# Patient Record
Sex: MALE | Race: OTHER | Employment: FULL TIME | URBAN - METROPOLITAN AREA
[De-identification: names, ages, dates, MRNs, and addresses within clinical notes are randomized per-mention and may not be internally consistent; named-entity substitution may affect disease eponyms.]

---

## 2017-02-10 ENCOUNTER — LAB ENCOUNTER (OUTPATIENT)
Dept: LAB | Age: 47
End: 2017-02-10
Attending: INTERNAL MEDICINE
Payer: COMMERCIAL

## 2017-02-10 ENCOUNTER — OFFICE VISIT (OUTPATIENT)
Dept: FAMILY MEDICINE CLINIC | Facility: CLINIC | Age: 47
End: 2017-02-10

## 2017-02-10 VITALS
BODY MASS INDEX: 32.34 KG/M2 | WEIGHT: 231 LBS | DIASTOLIC BLOOD PRESSURE: 80 MMHG | RESPIRATION RATE: 16 BRPM | HEART RATE: 70 BPM | TEMPERATURE: 98 F | SYSTOLIC BLOOD PRESSURE: 120 MMHG | HEIGHT: 71 IN

## 2017-02-10 DIAGNOSIS — Z00.00 ROUTINE GENERAL MEDICAL EXAMINATION AT A HEALTH CARE FACILITY: ICD-10-CM

## 2017-02-10 DIAGNOSIS — M54.5 LOW BACK PAIN, UNSPECIFIED BACK PAIN LATERALITY, UNSPECIFIED CHRONICITY, WITH SCIATICA PRESENCE UNSPECIFIED: ICD-10-CM

## 2017-02-10 DIAGNOSIS — R10.9 RIGHT FLANK PAIN: ICD-10-CM

## 2017-02-10 DIAGNOSIS — Z00.00 ROUTINE GENERAL MEDICAL EXAMINATION AT A HEALTH CARE FACILITY: Primary | ICD-10-CM

## 2017-02-10 PROBLEM — H91.90 HARD OF HEARING: Status: ACTIVE | Noted: 2017-02-10

## 2017-02-10 LAB
ALBUMIN SERPL-MCNC: 4 G/DL (ref 3.5–4.8)
ALP LIVER SERPL-CCNC: 66 U/L (ref 45–117)
ALT SERPL-CCNC: 36 U/L (ref 17–63)
AST SERPL-CCNC: 30 U/L (ref 15–41)
BASOPHILS # BLD AUTO: 0.06 X10(3) UL (ref 0–0.1)
BASOPHILS NFR BLD AUTO: 0.9 %
BILIRUB SERPL-MCNC: 0.6 MG/DL (ref 0.1–2)
BILIRUB UR QL STRIP.AUTO: NEGATIVE
BUN BLD-MCNC: 13 MG/DL (ref 8–20)
CALCIUM BLD-MCNC: 8.5 MG/DL (ref 8.3–10.3)
CHLORIDE: 105 MMOL/L (ref 101–111)
CHOLEST SMN-MCNC: 153 MG/DL (ref ?–200)
CLARITY UR REFRACT.AUTO: CLEAR
CO2: 28 MMOL/L (ref 22–32)
COLOR UR AUTO: YELLOW
CREAT BLD-MCNC: 1.1 MG/DL (ref 0.7–1.3)
EOSINOPHIL # BLD AUTO: 0.09 X10(3) UL (ref 0–0.3)
EOSINOPHIL NFR BLD AUTO: 1.3 %
ERYTHROCYTE [DISTWIDTH] IN BLOOD BY AUTOMATED COUNT: 13.2 % (ref 11.5–16)
EST. AVERAGE GLUCOSE BLD GHB EST-MCNC: 88 MG/DL (ref 68–126)
GLUCOSE BLD-MCNC: 89 MG/DL (ref 70–99)
GLUCOSE UR STRIP.AUTO-MCNC: NEGATIVE MG/DL
HBA1C MFR BLD HPLC: 4.7 % (ref ?–5.7)
HCT VFR BLD AUTO: 50.6 % (ref 37–53)
HDLC SERPL-MCNC: 45 MG/DL (ref 45–?)
HDLC SERPL: 3.4 {RATIO} (ref ?–4.97)
HGB BLD-MCNC: 17.7 G/DL (ref 13–17)
IMMATURE GRANULOCYTE COUNT: 0.03 X10(3) UL (ref 0–1)
IMMATURE GRANULOCYTE RATIO %: 0.4 %
KETONES UR STRIP.AUTO-MCNC: NEGATIVE MG/DL
LDLC SERPL CALC-MCNC: 81 MG/DL (ref ?–130)
LEUKOCYTE ESTERASE UR QL STRIP.AUTO: NEGATIVE
LYMPHOCYTES # BLD AUTO: 2.03 X10(3) UL (ref 0.9–4)
LYMPHOCYTES NFR BLD AUTO: 30 %
M PROTEIN MFR SERPL ELPH: 7.6 G/DL (ref 6.1–8.3)
MCH RBC QN AUTO: 31.1 PG (ref 27–33.2)
MCHC RBC AUTO-ENTMCNC: 35 G/DL (ref 31–37)
MCV RBC AUTO: 88.9 FL (ref 80–99)
MONOCYTES # BLD AUTO: 0.52 X10(3) UL (ref 0.1–0.6)
MONOCYTES NFR BLD AUTO: 7.7 %
NEUTROPHIL ABS PRELIM: 4.04 X10 (3) UL (ref 1.3–6.7)
NEUTROPHILS # BLD AUTO: 4.04 X10(3) UL (ref 1.3–6.7)
NEUTROPHILS NFR BLD AUTO: 59.7 %
NITRITE UR QL STRIP.AUTO: NEGATIVE
NONHDLC SERPL-MCNC: 108 MG/DL (ref ?–130)
PH UR STRIP.AUTO: 6 [PH] (ref 4.5–8)
PLATELET # BLD AUTO: 137 10(3)UL (ref 150–450)
POTASSIUM SERPL-SCNC: 3.8 MMOL/L (ref 3.6–5.1)
PROT UR STRIP.AUTO-MCNC: NEGATIVE MG/DL
RBC # BLD AUTO: 5.69 X10(6)UL (ref 4.3–5.7)
RBC UR QL AUTO: NEGATIVE
RED CELL DISTRIBUTION WIDTH-SD: 42.5 FL (ref 35.1–46.3)
SODIUM SERPL-SCNC: 139 MMOL/L (ref 136–144)
SP GR UR STRIP.AUTO: 1.02 (ref 1–1.03)
TRIGLYCERIDES: 136 MG/DL (ref ?–150)
UROBILINOGEN UR STRIP.AUTO-MCNC: <2 MG/DL
VLDL: 27 MG/DL (ref 5–40)
WBC # BLD AUTO: 6.8 X10(3) UL (ref 4–13)

## 2017-02-10 PROCEDURE — 80053 COMPREHEN METABOLIC PANEL: CPT

## 2017-02-10 PROCEDURE — 81003 URINALYSIS AUTO W/O SCOPE: CPT

## 2017-02-10 PROCEDURE — 80061 LIPID PANEL: CPT

## 2017-02-10 PROCEDURE — 99396 PREV VISIT EST AGE 40-64: CPT | Performed by: INTERNAL MEDICINE

## 2017-02-10 PROCEDURE — 85025 COMPLETE CBC W/AUTO DIFF WBC: CPT

## 2017-02-10 PROCEDURE — 83036 HEMOGLOBIN GLYCOSYLATED A1C: CPT

## 2017-02-10 PROCEDURE — 36415 COLL VENOUS BLD VENIPUNCTURE: CPT

## 2017-02-10 NOTE — PROGRESS NOTES
Wellness Exam    REASON FOR VISIT:    Tamara Flowers is a 48year old male who presents for an [address]     Current Complaints: r flank pain  Flu Shot: dedclined   Health Maintenance Topics with due status: Overdue       Topic Date Due    I Obesity Screening Screen all adults annually Body mass index is 32.23 kg/(m^2).       Preventive Services for Which Recommendations Vary with Risk Recommendation Internal Lab or Procedure External Lab or Procedure   Cholesterol Screening Recommended kerry nausea, vomiting, abdominal pain and diarrhea. R flank pain  Genitourinary: Negative for urgency, frequency and difficulty urinating. Musculoskeletal: Negative for arthralgias and no gait problem. Skin: Negative for color change and rash.    Neurologica flank pain    PLAN SUMMARY:   Salvador Mtz is a 48year old male  Age appropriate cancer screening, labs, safety, immunizations were discussed with the patient and ordered as follows:  Diagnoses and all orders for this visit:    Low back pain, unspecifie of hearing

## 2017-02-10 NOTE — PATIENT INSTRUCTIONS
Thank you for choosing Anthony Cuenca MD at [place] [address]  To Do: Salvador Mtz  1. Continue diet and weight loss  2. Labs today  3.  Possible kidney stone- check ct scan Call central scheduling [phone] to schedule your test.  Most tests are done low calorie) really works. Such as slimfast or medifast or optifast can buy online. But needs to be consistent and used for long term.   Even a Radha or a Briana Varela meal replacements (pricey) can work but taking the place of the [employer].    F foods that are in the fresh produce section such as salad fruit vegetables, berries, nuts, lean protein like fish, chicken. Avoid processed foods like sausage, louis. Avoid carbs see the list below. No dairy.     9. Weight loss medications- Current FDA a Testing [phone] (For example: Holter Monitor, Cardiac Stress tests,Event Monitor, or 2D Echocardiograms)  •[facility] call [phone] usually in [address] [facility] in Clinic in [place] at [facility].  [address] Mon-Fri at 8am-7:30pm, and Sat/S best provide you care, patients receiving maintenance medications need to be seen at least twice a year. Collin Silva

## 2017-02-11 NOTE — PROGRESS NOTES
Quick Note:    Labs here are normal, all are fine. No kidney stones by labwork.  Please notify the patient.  ______

## 2019-01-02 NOTE — H&P
[facility]    PATIENT'S NAME: Gabriel Bolanos   ATTENDING PHYSICIAN: Yamileth Singleton MD   PATIENT ACCOUNT#:   [id number]    [facility]  MEDICAL RECORD #:   [id number]       YOB: 1970  ADMISSION DATE:       01/05/2019

## 2019-01-05 ENCOUNTER — HOSPITAL ENCOUNTER (OUTPATIENT)
Facility: HOSPITAL | Age: 49
Setting detail: HOSPITAL OUTPATIENT SURGERY
Discharge: HOME | End: 2019-01-05
Attending: SPECIALIST | Admitting: SPECIALIST
Payer: COMMERCIAL

## 2019-01-05 DIAGNOSIS — Z12.11 COLON CANCER SCREENING: ICD-10-CM

## 2019-01-05 PROCEDURE — 0DBN8ZX EXCISION OF SIGMOID COLON, VIA NATURAL OR ARTIFICIAL OPENING ENDOSCOPIC, DIAGNOSTIC: ICD-10-PCS | Performed by: SPECIALIST

## 2019-01-05 PROCEDURE — 99153 MOD SED SAME PHYS/QHP EA: CPT | Performed by: SPECIALIST

## 2019-01-05 PROCEDURE — 99152 MOD SED SAME PHYS/QHP 5/>YRS: CPT | Performed by: SPECIALIST

## 2019-01-05 PROCEDURE — 88305 TISSUE EXAM BY PATHOLOGIST: CPT | Performed by: SPECIALIST

## 2019-01-05 RX ORDER — SODIUM CHLORIDE 0.9 % (FLUSH) 0.9 %
10 SYRINGE (ML) INJECTION AS NEEDED
Status: DISCONTINUED | OUTPATIENT
Start: 2019-01-05 | End: 2019-01-05

## 2019-01-05 RX ORDER — SODIUM CHLORIDE, SODIUM LACTATE, POTASSIUM CHLORIDE, CALCIUM CHLORIDE 600; 310; 30; 20 MG/100ML; MG/100ML; MG/100ML; MG/100ML
INJECTION, SOLUTION INTRAVENOUS CONTINUOUS
Status: DISCONTINUED | OUTPATIENT
Start: 2019-01-05 | End: 2019-01-05

## 2019-01-05 RX ORDER — MIDAZOLAM HYDROCHLORIDE 1 MG/ML
INJECTION INTRAMUSCULAR; INTRAVENOUS
Status: DISCONTINUED | OUTPATIENT
Start: 2019-01-05 | End: 2019-01-05

## 2019-01-05 RX ORDER — MIDAZOLAM HYDROCHLORIDE 1 MG/ML
1 INJECTION INTRAMUSCULAR; INTRAVENOUS EVERY 5 MIN PRN
Status: DISCONTINUED | OUTPATIENT
Start: 2019-01-05 | End: 2019-01-05

## 2019-01-05 NOTE — OPERATIVE REPORT
[facility]    PATIENT'S NAME: Rambo Del Real   ATTENDING PHYSICIAN: Yamileth Abernathy MD   OPERATING PHYSICIAN: Susan Fraga.  Inna Abernathy MD   PATIENT ACCOUNT#:   [id number]    LOCATION:  [facility] ROOM 3 [facility] 10  MEDICAL RECORD #:   [id number]

## 2019-01-07 VITALS
WEIGHT: 244 LBS | RESPIRATION RATE: 18 BRPM | HEIGHT: 71 IN | SYSTOLIC BLOOD PRESSURE: 95 MMHG | OXYGEN SATURATION: 94 % | DIASTOLIC BLOOD PRESSURE: 79 MMHG | HEART RATE: 71 BPM | BODY MASS INDEX: 34.16 KG/M2

## 2019-09-14 ENCOUNTER — HOSPITAL ENCOUNTER (EMERGENCY)
Facility: HOSPITAL | Age: 49
Discharge: HOME | End: 2019-09-14
Attending: EMERGENCY MEDICINE
Payer: COMMERCIAL

## 2019-09-14 VITALS
WEIGHT: 242 LBS | SYSTOLIC BLOOD PRESSURE: 132 MMHG | HEART RATE: 74 BPM | RESPIRATION RATE: 18 BRPM | BODY MASS INDEX: 34.65 KG/M2 | DIASTOLIC BLOOD PRESSURE: 88 MMHG | TEMPERATURE: 98 F | OXYGEN SATURATION: 97 % | HEIGHT: 70 IN

## 2019-09-14 DIAGNOSIS — M54.16 LUMBAR RADICULOPATHY: Primary | ICD-10-CM

## 2019-09-14 PROCEDURE — 96372 THER/PROPH/DIAG INJ SC/IM: CPT

## 2019-09-14 PROCEDURE — 99283 EMERGENCY DEPT VISIT LOW MDM: CPT

## 2019-09-14 RX ORDER — IBUPROFEN 600 MG/1
600 TABLET ORAL ONCE
Status: COMPLETED | OUTPATIENT
Start: 2019-09-14 | End: 2019-09-14

## 2019-09-14 RX ORDER — IBUPROFEN 600 MG/1
600 TABLET ORAL EVERY 6 HOURS PRN
Qty: 30 TABLET | Refills: 0 | Status: SHIPPED | OUTPATIENT
Start: 2019-09-14 | End: 2019-09-21

## 2019-09-14 RX ORDER — MORPHINE SULFATE 4 MG/ML
10 INJECTION, SOLUTION INTRAMUSCULAR; INTRAVENOUS ONCE
Status: COMPLETED | OUTPATIENT
Start: 2019-09-14 | End: 2019-09-14

## 2019-09-14 RX ORDER — HYDROCODONE BITARTRATE AND ACETAMINOPHEN 5; 325 MG/1; MG/1
1-2 TABLET ORAL EVERY 6 HOURS PRN
Qty: 20 TABLET | Refills: 0 | Status: SHIPPED | OUTPATIENT
Start: 2019-09-14 | End: 2019-09-21

## 2019-09-14 NOTE — ED NOTES
Patient presents with left hip pain that travels to foot. Pain has been ongoing for past 3 years, but worst tonight. Denies injury or fall.

## 2019-09-14 NOTE — ED INITIAL ASSESSMENT (HPI)
Pt c/o left hip pain that radiates to left thigh area x 2 days, denies any injury, denies any swelling, took ibuprofen 600 mg 30 minutes ago with no relief, denies fever, denies urinary symptom

## 2019-09-14 NOTE — ED PROVIDER NOTES
Patient Seen in: [facility] AND [facility] Emergency Department    History   Patient presents with:  Hip Pain    Stated Complaint: left leg pain    HPI    3 years of worsening pain in the low back radiating down the left leg.  Now pain is severe and radiates down tone.   Skin: Skin is warm and dry. Psychiatric: He has a normal mood and affect. His behavior is normal.   Nursing note and vitals reviewed. ED Course   Labs Reviewed - No data to display     consistent with radiculopathy.  Discharge on nsaids

## 2019-09-24 ENCOUNTER — HOSPITAL ENCOUNTER (OUTPATIENT)
Dept: MRI IMAGING | Age: 49
Discharge: HOME | End: 2019-09-24
Attending: FAMILY MEDICINE
Payer: COMMERCIAL

## 2019-09-24 DIAGNOSIS — M54.30 SCIATICA, UNSPECIFIED LATERALITY: ICD-10-CM

## 2019-09-24 PROCEDURE — 72148 MRI LUMBAR SPINE W/O DYE: CPT | Performed by: FAMILY MEDICINE

## 2019-10-02 ENCOUNTER — OFFICE VISIT (OUTPATIENT)
Dept: PAIN CLINIC | Facility: HOSPITAL | Age: 49
End: 2019-10-02
Attending: ANESTHESIOLOGY
Payer: COMMERCIAL

## 2019-10-02 VITALS — DIASTOLIC BLOOD PRESSURE: 74 MMHG | HEART RATE: 72 BPM | SYSTOLIC BLOOD PRESSURE: 125 MMHG

## 2019-10-02 DIAGNOSIS — M51.26 LUMBAR HERNIATED DISC: Primary | ICD-10-CM

## 2019-10-02 PROCEDURE — 99201 HC OUTPT EVAL AND MGNT NEW PT LEVEL 1: CPT

## 2019-10-02 RX ORDER — IBUPROFEN 200 MG
200 TABLET ORAL EVERY 6 HOURS PRN
COMMUNITY
End: 2021-04-20 | Stop reason: CLARIF

## 2019-10-02 NOTE — PROGRESS NOTES
Patient presents to [facility] ambulatory as a new patient referred by Dr Theodora Coughlin. He has chronic intermittent low back pain that radiates to his left hip and leg.   He has had this \"for a few years\" but states it has gotten worse in the last 3 weeks to where he c

## 2019-10-02 NOTE — CHRONIC PAIN
Initial Consultation Note      HISTORY OF PRESENT ILLNESS:  Rachelle Lane is a 46year old old male referred to the pain clinic  for valuation treatment of his low back pain which extends down to his left leg was a is a very nice 38-year-old male who work [employer]   • Hypertension Father    • Diabetes Father    • Lipids Father        SOCIAL HISTORY:  Social History    Socioeconomic History      Marital status:       Spouse name: Not on file      Number of children: Not on file      Years of education: N anicteric; no injection  Chest: S1, S2, RRR  Respiratory: CTAB  Gait: Normal; cane user - No  Spine: Normal    ROM:   Lumbar spine  Flexion dec  Extension dec  Cervical Spine  Flexion   Extension  MOTOR EXAMINATION:  UPPER EXTREMITY      LEFT RIGHT   Delto LEVELS: L1-L2: No significant disc/facet abnormality, spinal stenosis, or foraminal stenosis. L2-L3: No significant disc/facet abnormality, spinal stenosis, or foraminal stenosis. L3-L4: Small diffuse disc bulge with mild bilateral facet arthropathy.   Mi 02/10/2017     No results found for: PT    [place] PHYSICIAN MONITORING PROGRAM REVIEWED  Yes      ASSESSMENT:   46year old old male with herniated disc compressing the left L4 nerve root causing severe stenosis at this level  Trial of oral steroids anti

## 2019-10-03 ENCOUNTER — DOCUMENTATION ONLY (OUTPATIENT)
Dept: PAIN CLINIC | Facility: HOSPITAL | Age: 49
End: 2019-10-03

## 2019-10-03 NOTE — PROGRESS NOTES
Procedure code 84691---vxywusy approval     PA started via [address] # [id number]    Once case has been approved, Lyn Charles will reach out to the pt and schedule procedure

## 2019-10-10 ENCOUNTER — DOCUMENTATION ONLY (OUTPATIENT)
Dept: PAIN CLINIC | Facility: HOSPITAL | Age: 49
End: 2019-10-10

## 2019-10-10 ENCOUNTER — TELEPHONE (OUTPATIENT)
Dept: PAIN CLINIC | Facility: HOSPITAL | Age: 49
End: 2019-10-10

## 2019-10-10 NOTE — TELEPHONE ENCOUNTER
Attempted to reach pt to advised procedure has been approved. No answer message left requesting a call back.

## 2019-11-01 ENCOUNTER — OFFICE VISIT (OUTPATIENT)
Dept: PAIN CLINIC | Facility: HOSPITAL | Age: 49
End: 2019-11-01
Attending: NURSE PRACTITIONER
Payer: COMMERCIAL

## 2019-11-01 DIAGNOSIS — M51.26 LUMBAR HERNIATED DISC: Primary | ICD-10-CM

## 2019-11-01 PROCEDURE — 99211 OFF/OP EST MAY X REQ PHY/QHP: CPT

## 2019-11-01 NOTE — CHRONIC PAIN
Follow-up Note  CC: follow up injection   HISTORY OF PRESENT ILLNESS:  Anahi Madsen is a 44year old old male, originally referred to the pain clinic by Dr. Gandara ref.  provider found, with history of Lumbar herniated disc  (primary encounter diagnosis) retu Socioeconomic History      Marital status:       Spouse name: Not on file      Number of children: Not on file      Years of education: Not on file      Highest education level: Not on file    Occupational History      Not on file    Social Needs anicteric; no injection  Ears: no obvious deformities noted   Nose: externally grossly within normal limits, no unusual discharge or rhinorrhea   Throat: lips grossly within normal limits by visual exam externally  Neck: supple, trachea midline, no obvious

## 2019-11-01 NOTE — PROGRESS NOTES
Patient presents to [facility] ambulatory for follow up of left trans sondra done 10-17. He reports 98% pain relief. Today he does not have pain. He takes ibuprofen prn. He still has some n/t in his left upper leg intermittently. JOHN Arreguin in to see patient.  Se
10

## 2020-09-08 ENCOUNTER — TELEPHONE (OUTPATIENT)
Dept: FAMILY MEDICINE CLINIC | Facility: CLINIC | Age: 50
End: 2020-09-08

## 2020-09-08 ENCOUNTER — HOSPITAL ENCOUNTER (OUTPATIENT)
Age: 50
Discharge: HOME | End: 2020-09-08
Payer: COMMERCIAL

## 2020-09-08 VITALS
WEIGHT: 255 LBS | TEMPERATURE: 98 F | HEIGHT: 70 IN | SYSTOLIC BLOOD PRESSURE: 145 MMHG | RESPIRATION RATE: 18 BRPM | OXYGEN SATURATION: 95 % | BODY MASS INDEX: 36.51 KG/M2 | DIASTOLIC BLOOD PRESSURE: 104 MMHG | HEART RATE: 68 BPM

## 2020-09-08 DIAGNOSIS — M54.42 ACUTE LEFT-SIDED LOW BACK PAIN WITH LEFT-SIDED SCIATICA: Primary | ICD-10-CM

## 2020-09-08 PROCEDURE — 96372 THER/PROPH/DIAG INJ SC/IM: CPT | Performed by: PHYSICIAN ASSISTANT

## 2020-09-08 PROCEDURE — 99214 OFFICE O/P EST MOD 30 MIN: CPT | Performed by: PHYSICIAN ASSISTANT

## 2020-09-08 RX ORDER — KETOROLAC TROMETHAMINE 30 MG/ML
60 INJECTION, SOLUTION INTRAMUSCULAR; INTRAVENOUS ONCE
Status: COMPLETED | OUTPATIENT
Start: 2020-09-08 | End: 2020-09-08

## 2020-09-08 RX ORDER — HYDROCODONE BITARTRATE AND ACETAMINOPHEN 5; 325 MG/1; MG/1
1-2 TABLET ORAL EVERY 6 HOURS PRN
Qty: 10 TABLET | Refills: 0 | Status: SHIPPED | OUTPATIENT
Start: 2020-09-08 | End: 2020-09-15

## 2020-09-08 RX ORDER — HYDROCODONE BITARTRATE AND ACETAMINOPHEN 5; 325 MG/1; MG/1
2 TABLET ORAL ONCE
Status: COMPLETED | OUTPATIENT
Start: 2020-09-08 | End: 2020-09-08

## 2020-09-08 RX ORDER — METHYLPREDNISOLONE 4 MG/1
TABLET ORAL
Qty: 1 PACKAGE | Refills: 0 | Status: SHIPPED | OUTPATIENT
Start: 2020-09-08 | End: 2020-09-15

## 2020-09-08 NOTE — ED PROVIDER NOTES
Patient Seen in: [facility]      History   Patient presents with:  Back Pain    Stated Complaint: lower back pain    HPI    CHIEF COMPLAINT: Low back pain with leftt  leg radiation of pain    HISTORY OF PRESENT ILLNESS: Aarti Machado as listed in today's nurse's notes are reviewed and agree. The patient's family history is reviewed and is noncontributory to the presenting problem, except as indicated as above.     Past Medical History:   Diagnosis Date   • Back pain    • Blood pressure distention. Neurological:  Grossly intact, no deficits. Skin: warm and dry, no rashes. Musculoskeletal:  neck is supple non tender. no meningeal signs        Lumbar spine:  There is no midline or bony tenderness but there is left-sided paraspinal muscle s Patient felt comfortable going home.               Disposition and Plan     Clinical Impression:  Acute left-sided low back pain with left-sided sciatica  (primary encounter diagnosis)    Disposition:  Discharge  9/8/2020 10:26 am    Follow-up:  Erinn Pompa

## 2020-09-08 NOTE — ED INITIAL ASSESSMENT (HPI)
C/o left lower back pain with radiating left leg pain x 1.5 weeks and it's been getting worse. Rating left leg pain 10/10. Has tingling but denies numbness. Had a cortisone shot on Friday with no relief. Took tylenol at 0200 today.  History 2 years ago of a

## 2020-09-09 NOTE — TELEPHONE ENCOUNTER
Can discuss referral at [facility] with Dr. Norberto Bailey on 9/10/2020
Future Appointments   Date Time Provider Jarrett Alcantar   9/10/2020  4:00 PM Tonio Liao MD [facility] [address] [facility] [address]   9/15/2020  9:30 AM Selina Carlton DO [facility] PAIN [facility]
Needs a referral to pain specialist.
Pt has an appt scheduled already.     Future Appointments   Date Time Provider Jarrett Katharine   9/10/2020  4:00 PM Yumiko Duarte MD [facility] [address] [facility] [address]
Pt hasn't been seen by Dr. Diane Workman, last visit 2/10/17 with Dr. Sammi De Los Santos. Please schedule pt for OV. Thanks!
Right Hand/Right Foot

## 2020-09-10 ENCOUNTER — OFFICE VISIT (OUTPATIENT)
Dept: FAMILY MEDICINE CLINIC | Facility: CLINIC | Age: 50
End: 2020-09-10

## 2020-09-10 VITALS
BODY MASS INDEX: 36.36 KG/M2 | TEMPERATURE: 98 F | RESPIRATION RATE: 12 BRPM | HEIGHT: 70 IN | WEIGHT: 254 LBS | HEART RATE: 104 BPM | SYSTOLIC BLOOD PRESSURE: 142 MMHG | OXYGEN SATURATION: 98 % | DIASTOLIC BLOOD PRESSURE: 94 MMHG

## 2020-09-10 DIAGNOSIS — Z13.228 SCREENING FOR ENDOCRINE, METABOLIC AND IMMUNITY DISORDER: ICD-10-CM

## 2020-09-10 DIAGNOSIS — M54.16 LUMBAR BACK PAIN WITH RADICULOPATHY AFFECTING LEFT LOWER EXTREMITY: Primary | ICD-10-CM

## 2020-09-10 DIAGNOSIS — Z13.29 SCREENING FOR ENDOCRINE, METABOLIC AND IMMUNITY DISORDER: ICD-10-CM

## 2020-09-10 DIAGNOSIS — Z13.0 SCREENING FOR ENDOCRINE, METABOLIC AND IMMUNITY DISORDER: ICD-10-CM

## 2020-09-10 DIAGNOSIS — Z80.0 FAMILY HISTORY OF COLON CANCER: ICD-10-CM

## 2020-09-10 DIAGNOSIS — Z12.5 SCREENING FOR MALIGNANT NEOPLASM OF PROSTATE: ICD-10-CM

## 2020-09-10 PROBLEM — Z80.42 FAMILY HISTORY OF PROSTATE CANCER: Status: ACTIVE | Noted: 2020-09-10

## 2020-09-10 PROCEDURE — 3077F SYST BP >= 140 MM HG: CPT | Performed by: FAMILY MEDICINE

## 2020-09-10 PROCEDURE — 3008F BODY MASS INDEX DOCD: CPT | Performed by: FAMILY MEDICINE

## 2020-09-10 PROCEDURE — 99203 OFFICE O/P NEW LOW 30 MIN: CPT | Performed by: FAMILY MEDICINE

## 2020-09-10 PROCEDURE — 3080F DIAST BP >= 90 MM HG: CPT | Performed by: FAMILY MEDICINE

## 2020-09-10 NOTE — PATIENT INSTRUCTIONS
Thank you for choosing Esther Lomeli MD at [place] [address]  To Do: Brand Demark  1. Please have labs done as ordered  [facility] is located in [address]. Monday, Tuesday & Friday – 8 a.m. to 4 p.m.   Wednesday, Thursday – 7 a.m. to 3 p.m those potential risks and we strive to make you healthier and to improve your quality of life.     Referrals, and Radiology Information:    If your insurance requires a referral to a specialist, please allow 5 business days to process your referral request.

## 2020-09-10 NOTE — PROGRESS NOTES
HPI:    Patient ID: Tj Travis is a 44year old male.     HPI   Mr. Gavi Ahmadi is a pleasant 24-year-old male with history of L4-L5 left neural foraminal stenosis and impingement of corresponding nerve root resulting in lumbar radiculopathy who was seen at Medication Sig Dispense Refill   • HYDROcodone-acetaminophen 5-325 MG Oral Tab Take 1-2 tablets by mouth every 6 (six) hours as needed for Pain.  10 tablet 0   • methylPREDNISolone (MEDROL) 4 MG Oral Tablet Therapy Pack Dosepack: take as directed 1 Packag Influenza Vaccine Refused (Order that documents the process)      Follow-up for his next physical; notify him once we get test results.     Meds This Visit:  Requested Prescriptions      No prescriptions requested or ordered in this encounter       Jessica

## 2020-09-11 ENCOUNTER — LAB ENCOUNTER (OUTPATIENT)
Dept: LAB | Age: 50
End: 2020-09-11
Attending: FAMILY MEDICINE
Payer: COMMERCIAL

## 2020-09-11 DIAGNOSIS — Z13.0 SCREENING FOR ENDOCRINE, METABOLIC AND IMMUNITY DISORDER: ICD-10-CM

## 2020-09-11 DIAGNOSIS — Z13.29 SCREENING FOR ENDOCRINE, METABOLIC AND IMMUNITY DISORDER: ICD-10-CM

## 2020-09-11 DIAGNOSIS — Z12.5 SCREENING FOR MALIGNANT NEOPLASM OF PROSTATE: ICD-10-CM

## 2020-09-11 DIAGNOSIS — Z13.228 SCREENING FOR ENDOCRINE, METABOLIC AND IMMUNITY DISORDER: ICD-10-CM

## 2020-09-11 LAB
ALBUMIN SERPL-MCNC: 3.9 G/DL (ref 3.4–5)
ALBUMIN/GLOB SERPL: 1 {RATIO} (ref 1–2)
ALP LIVER SERPL-CCNC: 61 U/L (ref 45–117)
ALT SERPL-CCNC: 42 U/L (ref 16–61)
ANION GAP SERPL CALC-SCNC: 5 MMOL/L (ref 0–18)
AST SERPL-CCNC: 18 U/L (ref 15–37)
BASOPHILS # BLD AUTO: 0.09 X10(3) UL (ref 0–0.2)
BASOPHILS NFR BLD AUTO: 0.8 %
BILIRUB SERPL-MCNC: 0.8 MG/DL (ref 0.1–2)
BUN BLD-MCNC: 21 MG/DL (ref 7–18)
BUN/CREAT SERPL: 18.1 (ref 10–20)
CALCIUM BLD-MCNC: 8.9 MG/DL (ref 8.5–10.1)
CHLORIDE SERPL-SCNC: 103 MMOL/L (ref 98–112)
CHOLEST SMN-MCNC: 164 MG/DL (ref ?–200)
CO2 SERPL-SCNC: 30 MMOL/L (ref 21–32)
COMPLEXED PSA SERPL-MCNC: 0.57 NG/ML (ref ?–4)
CREAT BLD-MCNC: 1.16 MG/DL (ref 0.7–1.3)
DEPRECATED RDW RBC AUTO: 45.1 FL (ref 35.1–46.3)
EOSINOPHIL # BLD AUTO: 0.04 X10(3) UL (ref 0–0.7)
EOSINOPHIL NFR BLD AUTO: 0.3 %
ERYTHROCYTE [DISTWIDTH] IN BLOOD BY AUTOMATED COUNT: 13.9 % (ref 11–15)
GLOBULIN PLAS-MCNC: 4 G/DL (ref 2.8–4.4)
GLUCOSE BLD-MCNC: 67 MG/DL (ref 70–99)
HCT VFR BLD AUTO: 61.5 % (ref 39–53)
HDLC SERPL-MCNC: 48 MG/DL (ref 40–59)
HGB BLD-MCNC: 20.2 G/DL (ref 13–17.5)
IMM GRANULOCYTES # BLD AUTO: 0.16 X10(3) UL (ref 0–1)
IMM GRANULOCYTES NFR BLD: 1.3 %
LDLC SERPL CALC-MCNC: 88 MG/DL (ref ?–100)
LYMPHOCYTES # BLD AUTO: 2.75 X10(3) UL (ref 1–4)
LYMPHOCYTES NFR BLD AUTO: 23.1 %
M PROTEIN MFR SERPL ELPH: 7.9 G/DL (ref 6.4–8.2)
MCH RBC QN AUTO: 29.9 PG (ref 26–34)
MCHC RBC AUTO-ENTMCNC: 32.8 G/DL (ref 31–37)
MCV RBC AUTO: 91.1 FL (ref 80–100)
MONOCYTES # BLD AUTO: 1.3 X10(3) UL (ref 0.1–1)
MONOCYTES NFR BLD AUTO: 10.9 %
NEUTROPHILS # BLD AUTO: 7.57 X10 (3) UL (ref 1.5–7.7)
NEUTROPHILS # BLD AUTO: 7.57 X10(3) UL (ref 1.5–7.7)
NEUTROPHILS NFR BLD AUTO: 63.6 %
NONHDLC SERPL-MCNC: 116 MG/DL (ref ?–130)
OSMOLALITY SERPL CALC.SUM OF ELEC: 287 MOSM/KG (ref 275–295)
PATIENT FASTING Y/N/NP: YES
PATIENT FASTING Y/N/NP: YES
PLATELET # BLD AUTO: 160 10(3)UL (ref 150–450)
POTASSIUM SERPL-SCNC: 4.2 MMOL/L (ref 3.5–5.1)
RBC # BLD AUTO: 6.75 X10(6)UL (ref 4.3–5.7)
SODIUM SERPL-SCNC: 138 MMOL/L (ref 136–145)
T4 FREE SERPL-MCNC: 1.2 NG/DL (ref 0.8–1.7)
TRIGL SERPL-MCNC: 140 MG/DL (ref 30–149)
TSI SER-ACNC: 2.88 MIU/ML (ref 0.36–3.74)
VLDLC SERPL CALC-MCNC: 28 MG/DL (ref 0–30)
WBC # BLD AUTO: 11.9 X10(3) UL (ref 4–11)

## 2020-09-11 PROCEDURE — 84443 ASSAY THYROID STIM HORMONE: CPT

## 2020-09-11 PROCEDURE — 80053 COMPREHEN METABOLIC PANEL: CPT

## 2020-09-11 PROCEDURE — 85025 COMPLETE CBC W/AUTO DIFF WBC: CPT

## 2020-09-11 PROCEDURE — 36415 COLL VENOUS BLD VENIPUNCTURE: CPT

## 2020-09-11 PROCEDURE — 84439 ASSAY OF FREE THYROXINE: CPT

## 2020-09-11 PROCEDURE — 80061 LIPID PANEL: CPT

## 2020-09-15 ENCOUNTER — OFFICE VISIT (OUTPATIENT)
Dept: PAIN CLINIC | Facility: HOSPITAL | Age: 50
End: 2020-09-15
Attending: ANESTHESIOLOGY
Payer: COMMERCIAL

## 2020-09-15 VITALS — BODY MASS INDEX: 36.51 KG/M2 | WEIGHT: 255 LBS | RESPIRATION RATE: 18 BRPM | HEIGHT: 70 IN

## 2020-09-15 DIAGNOSIS — M51.26 LUMBAR HERNIATED DISC: ICD-10-CM

## 2020-09-15 DIAGNOSIS — M54.16 LUMBAR BACK PAIN WITH RADICULOPATHY AFFECTING LEFT LOWER EXTREMITY: Primary | ICD-10-CM

## 2020-09-15 DIAGNOSIS — M47.816 FACET ARTHROPATHY, LUMBAR: ICD-10-CM

## 2020-09-15 PROCEDURE — 99211 OFF/OP EST MAY X REQ PHY/QHP: CPT

## 2020-09-15 NOTE — PROGRESS NOTES
9/15/2020  Presents ambulatory to [facility];  Has returned with C?O LLBP RADIATING DOWN LLE;   Pt reports the pain started again 3 wks ago;  6/10; Pt reports completion of medrol dose pk which  \"really helped the inflamation\";   Pt was seen last 10/2019 with s

## 2020-09-16 ENCOUNTER — TELEPHONE (OUTPATIENT)
Dept: FAMILY MEDICINE CLINIC | Facility: CLINIC | Age: 50
End: 2020-09-16

## 2020-09-17 ENCOUNTER — TELEPHONE (OUTPATIENT)
Dept: PAIN CLINIC | Facility: HOSPITAL | Age: 50
End: 2020-09-17

## 2020-09-17 ENCOUNTER — DOCUMENTATION ONLY (OUTPATIENT)
Dept: PAIN CLINIC | Facility: HOSPITAL | Age: 50
End: 2020-09-17

## 2020-09-17 NOTE — PROGRESS NOTES
Procedure code 49052- PENDING APPROVAL     [employer]--PA needed via [employer]   Call clinical notes submitted via [facility] web portal     Case is pending   Pending auth # [id number]    Once approval has been rcv'd writer will reach out to pt and schedule procedure.

## 2020-09-24 ENCOUNTER — DOCUMENTATION ONLY (OUTPATIENT)
Dept: PAIN CLINIC | Facility: HOSPITAL | Age: 50
End: 2020-09-24

## 2020-09-24 NOTE — PROGRESS NOTES
Procedure code 10/5/20 APPROVED    Auth received from [facility] # [id number]  Valid from 09/17/20-12/16/20    Order form faxed to the surgery center, confirmation rcv'd

## 2020-09-29 ENCOUNTER — TELEPHONE (OUTPATIENT)
Dept: FAMILY MEDICINE CLINIC | Facility: CLINIC | Age: 50
End: 2020-09-29

## 2020-09-29 NOTE — TELEPHONE ENCOUNTER
Dr.Tomacruz- Munoz is requesting a call back with lab results when available. Please call [phone]  Can leave a message.

## 2020-10-05 ENCOUNTER — SURGERY CENTER DOCUMENTATION (OUTPATIENT)
Dept: SURGERY | Age: 50
End: 2020-10-05

## 2020-10-05 NOTE — PROCEDURES
[facility] AT [facility]            Patient: Natty Loredo  MR #:[id number]  : 10/7/1970        Operative Report        Date of procedure: 10/5/2020    Preoperative diagnosis: HNP L4/5 With Radiculopathy left  Postop diagnosis:HNP L4/5 With Rad and was discharged home with instructions. Electronically approved by: Shakira Naidu.  Romulo Ferrera MD

## 2020-10-12 ENCOUNTER — TELEPHONE (OUTPATIENT)
Dept: PAIN CLINIC | Facility: HOSPITAL | Age: 50
End: 2020-10-12

## 2020-10-12 NOTE — TELEPHONE ENCOUNTER
Writer spoke with pt, states still having pain at the low back radiating down to the leg. Writer advised pt, it may take up to 2 weeks for pt to feel the full effect of the steroid.  Advised pt, today is only 1 week post procedure, wait till Monday 10/19 an

## 2020-10-23 DIAGNOSIS — D58.2 ELEVATED HEMOGLOBIN (HCC): Primary | ICD-10-CM

## 2020-10-28 ENCOUNTER — TELEPHONE (OUTPATIENT)
Dept: FAMILY MEDICINE CLINIC | Facility: CLINIC | Age: 50
End: 2020-10-28

## 2020-11-16 ENCOUNTER — APPOINTMENT (OUTPATIENT)
Dept: LAB | Age: 50
End: 2020-11-16
Attending: INTERNAL MEDICINE
Payer: COMMERCIAL

## 2020-11-16 DIAGNOSIS — Z01.818 PRE-OP TESTING: ICD-10-CM

## 2020-11-19 PROBLEM — Z86.010 PERSONAL HISTORY OF COLONIC POLYPS: Status: ACTIVE | Noted: 2020-11-19

## 2020-11-19 PROBLEM — Z80.0 FAMILY HISTORY OF COLON CANCER: Status: ACTIVE | Noted: 2020-11-19

## 2020-11-19 PROBLEM — D12.2 BENIGN NEOPLASM OF ASCENDING COLON: Status: ACTIVE | Noted: 2020-11-19

## 2020-11-19 PROBLEM — D12.3 BENIGN NEOPLASM OF TRANSVERSE COLON: Status: ACTIVE | Noted: 2020-11-19

## 2020-11-19 PROBLEM — Z86.0100 PERSONAL HISTORY OF COLONIC POLYPS: Status: ACTIVE | Noted: 2020-11-19

## 2020-11-19 PROCEDURE — 88305 TISSUE EXAM BY PATHOLOGIST: CPT | Performed by: INTERNAL MEDICINE

## 2020-12-01 ENCOUNTER — OFFICE VISIT (OUTPATIENT)
Dept: FAMILY MEDICINE CLINIC | Facility: CLINIC | Age: 50
End: 2020-12-01

## 2020-12-01 VITALS
OXYGEN SATURATION: 98 % | BODY MASS INDEX: 36.36 KG/M2 | SYSTOLIC BLOOD PRESSURE: 140 MMHG | TEMPERATURE: 98 F | HEART RATE: 68 BPM | DIASTOLIC BLOOD PRESSURE: 80 MMHG | HEIGHT: 70 IN | WEIGHT: 254 LBS | RESPIRATION RATE: 16 BRPM

## 2020-12-01 DIAGNOSIS — M54.16 LUMBAR BACK PAIN WITH RADICULOPATHY AFFECTING LEFT LOWER EXTREMITY: Primary | ICD-10-CM

## 2020-12-01 PROCEDURE — 3079F DIAST BP 80-89 MM HG: CPT | Performed by: FAMILY MEDICINE

## 2020-12-01 PROCEDURE — 3077F SYST BP >= 140 MM HG: CPT | Performed by: FAMILY MEDICINE

## 2020-12-01 PROCEDURE — 3008F BODY MASS INDEX DOCD: CPT | Performed by: FAMILY MEDICINE

## 2020-12-01 PROCEDURE — 99213 OFFICE O/P EST LOW 20 MIN: CPT | Performed by: FAMILY MEDICINE

## 2020-12-01 NOTE — PATIENT INSTRUCTIONS
Thank you for choosing Ailyn Sanchez MD at [place] [address]  To Do: [address] [facility]  1. Please schedule your appointment with physical therapy  [facility] is located in [address]. Monday, Tuesday & Friday – 8 a.m. to 4 p.m.   Wednesday, [place] that the benefits outweigh those potential risks and we strive to make you healthier and to improve your quality of life.     Referrals, and Radiology Information:    If your insurance requires a referral to a specialist, please allow 5 business days to pro

## 2020-12-01 NOTE — PROGRESS NOTES
HPI:    Patient ID: Urban Lynch is a 44year old male.     HPI  Mr. Jaden Weiner is a pleasant 38-year-old gentleman with known history of lumbar radiculopathy secondary to left neuroforaminal stenosis and left L4 disc bulge with is protruding and pressing o helpful for him. No orders of the defined types were placed in this encounter.       Meds This Visit:  Requested Prescriptions      No prescriptions requested or ordered in this encounter       Imaging & Referrals:  OP REFERRAL TO [facility] &

## 2020-12-02 ENCOUNTER — TELEPHONE (OUTPATIENT)
Dept: PHYSICAL THERAPY | Age: 50
End: 2020-12-02

## 2020-12-08 ENCOUNTER — TELEMEDICINE (OUTPATIENT)
Dept: FAMILY MEDICINE CLINIC | Facility: CLINIC | Age: 50
End: 2020-12-08

## 2020-12-08 ENCOUNTER — OFFICE VISIT (OUTPATIENT)
Dept: PHYSICAL THERAPY | Age: 50
End: 2020-12-08
Attending: FAMILY MEDICINE
Payer: COMMERCIAL

## 2020-12-08 DIAGNOSIS — M10.9 ACUTE GOUT INVOLVING TOE OF RIGHT FOOT, UNSPECIFIED CAUSE: Primary | ICD-10-CM

## 2020-12-08 DIAGNOSIS — M54.16 LUMBAR BACK PAIN WITH RADICULOPATHY AFFECTING LEFT LOWER EXTREMITY: ICD-10-CM

## 2020-12-08 PROCEDURE — 97112 NEUROMUSCULAR REEDUCATION: CPT

## 2020-12-08 PROCEDURE — 97161 PT EVAL LOW COMPLEX 20 MIN: CPT

## 2020-12-08 PROCEDURE — 99213 OFFICE O/P EST LOW 20 MIN: CPT | Performed by: FAMILY MEDICINE

## 2020-12-08 RX ORDER — PREDNISONE 20 MG/1
40 TABLET ORAL DAILY
Qty: 10 TABLET | Refills: 0 | Status: SHIPPED | OUTPATIENT
Start: 2020-12-08 | End: 2020-12-13

## 2020-12-08 NOTE — PROGRESS NOTES
Subjective    This visit is conducted using Telemedicine with live, interactive video and audio.     Chief Complaint:  Patient presents with:  Gout        The patient confirmed knowledge of the limitations of the use of telemedicine were verbally confirme Stridor/Wheezing: Not present (per remote audio exam)  Respiratory effort: normal , No pursed-lip breathing, speaking in complete sentences  Neuro: Alert/oriented x3, speech is fluent, face symmetric  Foot: right great toe swollen and erythematous.    Psych

## 2020-12-08 NOTE — PATIENT INSTRUCTIONS
Gout    Gout is an inflammation of a joint caused by an inflammatory response to gout crystals in the joint fluid. This occurs when there is excess uric acid. Uric acid is a normal waste product in the body.  It builds up in the body when the kidneys can' · If you were prescribed a medicine to treat gout, take it as your healthcare provider has instructed. Don't skip doses. · Take anti-inflammatory medicine as directed by your healthcare provider.   · If pain medicines have been prescribed, take them exactl Raising the joint above the level of your heart can help reduce gout symptoms. Gout is a disease that affects the joints. It is caused by excess uric acid in your blood that may lead to crystals forming in your joints.  Left untreated, it can lead to pa ? Shellfish (lobster, crab, shrimp, scallop, mussel)  ? Certain fish (anchovy, sardine, herring, mackerel)  · Take any medicines prescribed by your healthcare provider. · Lose weight if you need to. · Reduce high fructose corn syrup in meals and drinks.

## 2020-12-09 NOTE — PROGRESS NOTES
LUMBAR EVALUATION:   Referring Physician: Dr. Darvin Foster  Diagnosis: Lumbar back pain with radiculopathy affecting left lower extremity     Date of Service: 12/8/2020     PATIENT Ronit Aponte is a 44year old y/o male who presents to therapy tod performs HEP correctly without reported pain.  Pt reports recent epidural injection which has significantly reduced his pain there it is likely that pt has several false negative special tests as he has an MRI confirmed L4 nerve root impingement and reduced Pt will be independent in comprehensive HEP for best functional outcome. Frequency / Duration: Patient will be seen for 2 x/week or a total of 8 visits over a 90 day period. Treatment will include: Manual Therapy; Therapeutic Exercises;  Neuromuscular

## 2020-12-10 ENCOUNTER — OFFICE VISIT (OUTPATIENT)
Dept: PHYSICAL THERAPY | Age: 50
End: 2020-12-10
Attending: FAMILY MEDICINE
Payer: COMMERCIAL

## 2020-12-10 DIAGNOSIS — M54.16 LUMBAR BACK PAIN WITH RADICULOPATHY AFFECTING LEFT LOWER EXTREMITY: ICD-10-CM

## 2020-12-10 PROCEDURE — 97110 THERAPEUTIC EXERCISES: CPT

## 2020-12-10 PROCEDURE — 97112 NEUROMUSCULAR REEDUCATION: CPT

## 2020-12-10 PROCEDURE — 97140 MANUAL THERAPY 1/> REGIONS: CPT

## 2020-12-10 NOTE — PROGRESS NOTES
Dx: Lumbar back pain with radiculopathy affecting left lower extremity           Insurance (Authorized # of Visits):  6           Authorizing Physician: Dr. Katie Howell  Next MD visit: none scheduled  Fall Risk: standard         Precautions: n/a             S Total Timed Treatment: 45 min  Total Treatment Time: 45 min

## 2020-12-14 ENCOUNTER — OFFICE VISIT (OUTPATIENT)
Dept: PHYSICAL THERAPY | Age: 50
End: 2020-12-14
Attending: FAMILY MEDICINE
Payer: COMMERCIAL

## 2020-12-14 PROCEDURE — 97110 THERAPEUTIC EXERCISES: CPT

## 2020-12-14 PROCEDURE — 97140 MANUAL THERAPY 1/> REGIONS: CPT

## 2020-12-14 PROCEDURE — 97112 NEUROMUSCULAR REEDUCATION: CPT

## 2020-12-14 NOTE — PROGRESS NOTES
Dx: Lumbar back pain with radiculopathy affecting left lower extremity           Insurance (Authorized # of Visits):  6           Authorizing Physician: Dr. Stan Green  Next MD visit: none scheduled  Fall Risk: standard         Precautions: n/a             S 3x30s    Charges: manual x1, neuro x1, therex x1       Total Timed Treatment: 45 min  Total Treatment Time: 45 min

## 2020-12-16 ENCOUNTER — OFFICE VISIT (OUTPATIENT)
Dept: PHYSICAL THERAPY | Age: 50
End: 2020-12-16
Attending: FAMILY MEDICINE
Payer: COMMERCIAL

## 2020-12-16 PROCEDURE — 97140 MANUAL THERAPY 1/> REGIONS: CPT

## 2020-12-16 PROCEDURE — 97110 THERAPEUTIC EXERCISES: CPT

## 2020-12-16 PROCEDURE — 97112 NEUROMUSCULAR REEDUCATION: CPT

## 2020-12-16 NOTE — PROGRESS NOTES
Dx: Lumbar back pain with radiculopathy affecting left lower extremity           Insurance (Authorized # of Visits):  6           Authorizing Physician: Dr. Dana Bales  Next MD visit: none scheduled  Fall Risk: standard         Precautions: n/a             S multifidi punches 20x ea  SB bridge 15x     Manual  Lumbar STM Manual  Lumbar STM Manual  Lumbar STM     HEP:  bridges 2x10, segmental LTR 15x ea, prone quad stretch 3x30s    Charges: manual x1, neuro x1, therex x1       Total Timed Treatment: 45 min  Tota

## 2020-12-22 ENCOUNTER — OFFICE VISIT (OUTPATIENT)
Dept: PHYSICAL THERAPY | Age: 50
End: 2020-12-22
Attending: FAMILY MEDICINE
Payer: COMMERCIAL

## 2020-12-22 PROCEDURE — 97110 THERAPEUTIC EXERCISES: CPT

## 2020-12-22 PROCEDURE — 97112 NEUROMUSCULAR REEDUCATION: CPT

## 2020-12-22 NOTE — PROGRESS NOTES
Discharge Summary  Pt has attended 5 visits in Physical Therapy.        Dx: Lumbar back pain with radiculopathy affecting left lower extremity           Insurance (Authorized # of Visits):  6           Authorizing Physician: Dr. Silvina San MD visit: no actively participate in planning and for this course of care. Thank you for your referral. If you have any questions, please contact me at Dept: [phone].     Sincerely,  Electronically signed by therapist: Frances Hopper PT     Physician's certifi

## 2020-12-23 ENCOUNTER — APPOINTMENT (OUTPATIENT)
Dept: PHYSICAL THERAPY | Age: 50
End: 2020-12-23
Attending: FAMILY MEDICINE
Payer: COMMERCIAL

## 2020-12-28 ENCOUNTER — APPOINTMENT (OUTPATIENT)
Dept: PHYSICAL THERAPY | Age: 50
End: 2020-12-28
Attending: FAMILY MEDICINE
Payer: COMMERCIAL

## 2020-12-30 ENCOUNTER — APPOINTMENT (OUTPATIENT)
Dept: PHYSICAL THERAPY | Age: 50
End: 2020-12-30
Attending: FAMILY MEDICINE
Payer: COMMERCIAL

## 2021-01-11 ENCOUNTER — OFFICE VISIT (OUTPATIENT)
Dept: FAMILY MEDICINE CLINIC | Facility: CLINIC | Age: 51
End: 2021-01-11

## 2021-01-11 VITALS
WEIGHT: 254 LBS | HEIGHT: 70 IN | SYSTOLIC BLOOD PRESSURE: 112 MMHG | DIASTOLIC BLOOD PRESSURE: 80 MMHG | TEMPERATURE: 98 F | RESPIRATION RATE: 16 BRPM | HEART RATE: 92 BPM | BODY MASS INDEX: 36.36 KG/M2 | OXYGEN SATURATION: 98 %

## 2021-01-11 DIAGNOSIS — S39.012A STRAIN OF LUMBAR REGION, INITIAL ENCOUNTER: Primary | ICD-10-CM

## 2021-01-11 DIAGNOSIS — M10.9 ACUTE GOUT INVOLVING TOE OF RIGHT FOOT, UNSPECIFIED CAUSE: ICD-10-CM

## 2021-01-11 PROCEDURE — 99214 OFFICE O/P EST MOD 30 MIN: CPT | Performed by: FAMILY MEDICINE

## 2021-01-11 PROCEDURE — 3079F DIAST BP 80-89 MM HG: CPT | Performed by: FAMILY MEDICINE

## 2021-01-11 PROCEDURE — 3074F SYST BP LT 130 MM HG: CPT | Performed by: FAMILY MEDICINE

## 2021-01-11 PROCEDURE — 3008F BODY MASS INDEX DOCD: CPT | Performed by: FAMILY MEDICINE

## 2021-01-11 RX ORDER — CYCLOBENZAPRINE HCL 10 MG
10 TABLET ORAL NIGHTLY PRN
Qty: 10 TABLET | Refills: 1 | Status: SHIPPED | OUTPATIENT
Start: 2021-01-11 | End: 2021-03-23 | Stop reason: ALTCHOICE

## 2021-01-11 NOTE — PROGRESS NOTES
HPI:    Patient ID: Mayo Mercer is a 44year old male. HPI  Mr. Melinda Medrano is an 57-year-old gentleman with history of lumbar degenerative joint disease and gout here today for right-sided lumbar pain for the past several days.   His symptoms started la Mouth/Throat: Oropharynx is clear and moist. No oropharyngeal exudate. Eyes: Conjunctivae are normal. No scleral icterus. Neck: Neck supple. No thyromegaly present. Cardiovascular: Normal rate, regular rhythm and normal heart sounds.    No murmur hear

## 2021-03-23 ENCOUNTER — OFFICE VISIT (OUTPATIENT)
Dept: FAMILY MEDICINE CLINIC | Facility: CLINIC | Age: 51
End: 2021-03-23
Payer: COMMERCIAL

## 2021-03-23 VITALS
HEIGHT: 70 IN | DIASTOLIC BLOOD PRESSURE: 94 MMHG | SYSTOLIC BLOOD PRESSURE: 140 MMHG | RESPIRATION RATE: 14 BRPM | WEIGHT: 256 LBS | BODY MASS INDEX: 36.65 KG/M2 | HEART RATE: 90 BPM | TEMPERATURE: 98 F | OXYGEN SATURATION: 98 %

## 2021-03-23 DIAGNOSIS — M54.16 LUMBAR BACK PAIN WITH RADICULOPATHY AFFECTING LEFT LOWER EXTREMITY: Primary | ICD-10-CM

## 2021-03-23 PROCEDURE — 3080F DIAST BP >= 90 MM HG: CPT | Performed by: FAMILY MEDICINE

## 2021-03-23 PROCEDURE — 3077F SYST BP >= 140 MM HG: CPT | Performed by: FAMILY MEDICINE

## 2021-03-23 PROCEDURE — 3008F BODY MASS INDEX DOCD: CPT | Performed by: FAMILY MEDICINE

## 2021-03-23 PROCEDURE — 99213 OFFICE O/P EST LOW 20 MIN: CPT | Performed by: FAMILY MEDICINE

## 2021-03-23 NOTE — PROGRESS NOTES
HPI/Subjective:   Patient ID: Mike Espino is a 44year old male. HPI  Mr. Fox Cook is a pleasant 47-year-old gentleman with history of lumbar radiculopathy, lumbar DJD and facet arthritis here today for chronic low back pain.   He had tried doing p Negative for dizziness, weakness, light-headedness and headaches. Current Outpatient Medications   Medication Sig Dispense Refill   • ibuprofen 200 MG Oral Tab Take 200 mg by mouth every 6 (six) hours as needed for Pain.        Allergies:No Known Allerg

## 2021-03-23 NOTE — PATIENT INSTRUCTIONS
Thank you for choosing Peña Shrestha MD at [place] [address]  To Do: [address] [facility]  1. Please schedule appointment with spine specialist  [facility] is located in [address]. Monday, Tuesday & Friday – 8 a.m. to 4 p.m.   Wednesday, Thursday benefits outweigh those potential risks and we strive to make you healthier and to improve your quality of life.     Referrals, and Radiology Information:    If your insurance requires a referral to a specialist, please allow 5 business days to process your opening where a spinal nerve root leaves the spinal canal.  · Disks serve as cushions between vertebrae. A disk’s soft center absorbs shock during movement.      Two vertebrae and a disk     The supporting muscles  Strong, flexible muscles help maintain you

## 2021-04-20 ENCOUNTER — APPOINTMENT (OUTPATIENT)
Dept: GENERAL RADIOLOGY | Age: 51
End: 2021-04-20
Attending: NURSE PRACTITIONER
Payer: COMMERCIAL

## 2021-04-20 ENCOUNTER — HOSPITAL ENCOUNTER (OUTPATIENT)
Facility: HOSPITAL | Age: 51
Setting detail: OBSERVATION
Discharge: HOME | End: 2021-04-21
Attending: EMERGENCY MEDICINE | Admitting: HOSPITALIST
Payer: COMMERCIAL

## 2021-04-20 ENCOUNTER — APPOINTMENT (OUTPATIENT)
Dept: CT IMAGING | Facility: HOSPITAL | Age: 51
End: 2021-04-20
Attending: EMERGENCY MEDICINE
Payer: COMMERCIAL

## 2021-04-20 ENCOUNTER — HOSPITAL ENCOUNTER (OUTPATIENT)
Age: 51
Discharge: ANOTHER HOSPITAL | End: 2021-04-20
Payer: COMMERCIAL

## 2021-04-20 VITALS
OXYGEN SATURATION: 97 % | WEIGHT: 255 LBS | HEART RATE: 74 BPM | BODY MASS INDEX: 36.51 KG/M2 | DIASTOLIC BLOOD PRESSURE: 90 MMHG | TEMPERATURE: 98 F | HEIGHT: 70 IN | SYSTOLIC BLOOD PRESSURE: 124 MMHG | RESPIRATION RATE: 18 BRPM

## 2021-04-20 DIAGNOSIS — R10.9 FLANK PAIN: ICD-10-CM

## 2021-04-20 DIAGNOSIS — M54.6 ACUTE RIGHT-SIDED THORACIC BACK PAIN: Primary | ICD-10-CM

## 2021-04-20 DIAGNOSIS — R79.89 ELEVATED D-DIMER: ICD-10-CM

## 2021-04-20 DIAGNOSIS — I26.99 BILATERAL PULMONARY EMBOLISM (HCC): Primary | ICD-10-CM

## 2021-04-20 PROCEDURE — 85378 FIBRIN DEGRADE SEMIQUANT: CPT | Performed by: NURSE PRACTITIONER

## 2021-04-20 PROCEDURE — 81002 URINALYSIS NONAUTO W/O SCOPE: CPT | Performed by: NURSE PRACTITIONER

## 2021-04-20 PROCEDURE — 74019 RADEX ABDOMEN 2 VIEWS: CPT | Performed by: NURSE PRACTITIONER

## 2021-04-20 PROCEDURE — 99220 INITIAL OBSERVATION CARE,LEVL III: CPT | Performed by: HOSPITALIST

## 2021-04-20 PROCEDURE — 99215 OFFICE O/P EST HI 40 MIN: CPT | Performed by: NURSE PRACTITIONER

## 2021-04-20 PROCEDURE — 71046 X-RAY EXAM CHEST 2 VIEWS: CPT | Performed by: NURSE PRACTITIONER

## 2021-04-20 PROCEDURE — 71275 CT ANGIOGRAPHY CHEST: CPT | Performed by: EMERGENCY MEDICINE

## 2021-04-20 PROCEDURE — 96372 THER/PROPH/DIAG INJ SC/IM: CPT | Performed by: NURSE PRACTITIONER

## 2021-04-20 PROCEDURE — 36415 COLL VENOUS BLD VENIPUNCTURE: CPT | Performed by: NURSE PRACTITIONER

## 2021-04-20 RX ORDER — HEPARIN SODIUM AND DEXTROSE 10000; 5 [USP'U]/100ML; G/100ML
INJECTION INTRAVENOUS CONTINUOUS
Status: DISCONTINUED | OUTPATIENT
Start: 2021-04-21 | End: 2021-04-22

## 2021-04-20 RX ORDER — ONDANSETRON 2 MG/ML
4 INJECTION INTRAMUSCULAR; INTRAVENOUS EVERY 6 HOURS PRN
Status: DISCONTINUED | OUTPATIENT
Start: 2021-04-20 | End: 2021-04-22

## 2021-04-20 RX ORDER — KETOROLAC TROMETHAMINE 30 MG/ML
30 INJECTION, SOLUTION INTRAMUSCULAR; INTRAVENOUS ONCE
Status: COMPLETED | OUTPATIENT
Start: 2021-04-20 | End: 2021-04-20

## 2021-04-20 RX ORDER — HEPARIN SODIUM AND DEXTROSE 10000; 5 [USP'U]/100ML; G/100ML
18 INJECTION INTRAVENOUS ONCE
Status: COMPLETED | OUTPATIENT
Start: 2021-04-20 | End: 2021-04-21

## 2021-04-20 RX ORDER — HEPARIN SODIUM 5000 [USP'U]/ML
80 INJECTION INTRAVENOUS; SUBCUTANEOUS ONCE
Status: COMPLETED | OUTPATIENT
Start: 2021-04-20 | End: 2021-04-20

## 2021-04-20 NOTE — ED PROVIDER NOTES
Patient Seen in: Immediate [address] [place] [address]      History   Patient presents with:  Back Pain    Stated Complaint: BACK PAIN    HPI/Subjective:   13-year-old male presents to the immediate care with right-sided thoracic back pain.   Patient states cresencio Physical Exam     ED Triage Vitals [04/20/21 1223]   BP (!) 122/95   Pulse 86   Resp 18   Temp 97.9 °F (36.6 °C)   Temp src Temporal   SpO2 97 %   O2 Device None (Room air)       Current:/90   Pulse 74   Temp 97.9 °F (36.6 °C) (Temporal)   Resp 18 Follow-up:  No follow-up provider specified.         Medications Prescribed:  Discharge Medication List as of 4/20/2021  1:55 PM

## 2021-04-20 NOTE — ED INITIAL ASSESSMENT (HPI)
Pt c/o right sided mid to lower back pain for 3 weeks. Pt denies any injury or fall. Pt states that his pain is not getting better. Pt states that the pain is worse with movement. Pt is ambulatory to the .

## 2021-04-20 NOTE — H&P
[facility]  History and Physical     Ernst Kelley Patient Status:  Emergency    10/7/1970 MRN [id number]   Location [facility] Attending Jaylan Sun MD   Hosp Day # 0 PCP Sherrell Iyer MD     Chief Complaint membranes. Neck: No carotid bruits. Respiratory: Clear to auscultation bilaterally. No wheezes. No rhonchi. Cardiovascular: S1, S2. Regular rate and rhythm. No murmurs, rubs or gallops. Equal pulses. Chest and Back: No tenderness or deformity.   Abdom

## 2021-04-20 NOTE — ED PROVIDER NOTES
Patient Seen in: [facility] Emergency Department      History   Patient presents with:  Abnormal Result  Back Pain    Stated Complaint: sent for CT, elevated d-dimer at [facility]. c/o mid back pain    HPI/Subjective:   HPI    53-year-old male with a hist Physical Exam    General:  Vitals as listed. No acute distress   HEENT: Sclerae anicteric. Conjunctivae show no pallor.   Oropharynx clear, mucous membranes moist   Lungs: good air exchange and clear   Heart: regular rate rhythm and no murmur  Hunter FINDINGS:  LUNGS:  No focal consolidation. Normal vascularity. CARDIAC:  Normal size cardiac silhouette. MEDIASTINUM:  Normal. PLEURA:  Mild degenerative disc disease. CONCLUSION:  No acute disease.     Dictated by (CST): Bjorn Newman MD emboli. Critical findings discussed Dr. Army Madsen at 1750 hours on 4/20/2021 with read back. 2. Fatty infiltration of the liver.     Dictated by (CST): Jewelene Litten, MD on 4/20/2021 at 5:48 PM     Finalized by (CST): Jewelene Litten, MD on 4/20/2021 at 5:52 P

## 2021-04-20 NOTE — ED INITIAL ASSESSMENT (HPI)
R mid back pain for a coupkle weeks but now worsening. Denies injury. D Dimer was elevated at urgent care.

## 2021-04-21 ENCOUNTER — APPOINTMENT (OUTPATIENT)
Dept: ULTRASOUND IMAGING | Facility: HOSPITAL | Age: 51
End: 2021-04-21
Attending: HOSPITALIST
Payer: COMMERCIAL

## 2021-04-21 ENCOUNTER — APPOINTMENT (OUTPATIENT)
Dept: CV DIAGNOSTICS | Facility: HOSPITAL | Age: 51
End: 2021-04-21
Attending: HOSPITALIST
Payer: COMMERCIAL

## 2021-04-21 VITALS
TEMPERATURE: 98 F | SYSTOLIC BLOOD PRESSURE: 113 MMHG | WEIGHT: 255 LBS | HEART RATE: 70 BPM | RESPIRATION RATE: 19 BRPM | BODY MASS INDEX: 37 KG/M2 | OXYGEN SATURATION: 98 % | DIASTOLIC BLOOD PRESSURE: 85 MMHG

## 2021-04-21 PROCEDURE — 93306 TTE W/DOPPLER COMPLETE: CPT | Performed by: HOSPITALIST

## 2021-04-21 PROCEDURE — 99223 1ST HOSP IP/OBS HIGH 75: CPT | Performed by: INTERNAL MEDICINE

## 2021-04-21 PROCEDURE — 93970 EXTREMITY STUDY: CPT | Performed by: HOSPITALIST

## 2021-04-21 PROCEDURE — 99217 OBSERVATION CARE DISCHARGE: CPT | Performed by: INTERNAL MEDICINE

## 2021-04-21 NOTE — PROGRESS NOTES
[facility]  Progress Note     Florecita Floydson Patient Status:  Inpatient    10/7/1970 MRN [id number]   [facility] 7NE-A Attending Allison Jackson MD   Hosp Day # 1 PCP Franklyn Brandon MD     Chief Complaint: back pain    S: HQCMRN 04/20/2021    COVID19 Not Detected 11/16/2020       Pro-Calcitonin  No results for input(s): PCT in the last 168 hours. Cardiac  Recent Labs   Lab 04/20/21  1614   TROP <0.045       Creatinine Kinase  No results for input(s): CK in the last 168 hours.

## 2021-04-21 NOTE — CONSULTS
[facility]  Report of Consultation    [facility] Patient Status:  Inpatient    10/7/1970 MRN [id number]   [facility] 7NE-A Attending Thea Crawley MD   [facility] Day # 1 PCP Reid Prak MD     Reason for Consultation:    The pa this visit. The pertinent positives and negatives were described. All other systems were negative.     Physical Exam:   Vital Signs: /85 (BP Location: Right arm)   Pulse 70   Temp 97.7 °F (36.5 °C) (Oral)   Resp 19   Wt 115.7 kg (255 lb)   SpO2 98% main or right or left central pulmonary trunks.    THORACIC AORTA:  No aneurysm or visible dissection.     LUNGS:  No visible pulmonary disease.     MEDIASTINUM:  No adenopathy or mass.     GUILLERMO:  No mass or adenopathy.     CARDIAC: Earla Ling is mild coronary continuing the anticoagulation indefinitely at the lower dose 10 mg maintenance after 6 months of the 20 mg daily dosing. 2. Erythrocytosis:    He has mild erythrocytosis. I sent a JAK2 study.  Will repeat the cbc in 2 weeks and decide on further work up

## 2021-04-21 NOTE — PLAN OF CARE
NURSING ADMISSION NOTE      Patient admitted via Cart  Oriented to room. Safety precautions initiated. Bed in low position. Call light in reach. Assumed care at 2130  AOx4  RA. NSR on tele.  VSS  Admission estela completed  Hep gtt infusing per p

## 2021-04-21 NOTE — PLAN OF CARE
Assumed care this am.   Pt alert x4, vitals stable. Hep gtt at 15ml/hr. Echo completed, US BLE (-) for DVT  Per heme can leave at 2100 once given dose of xarelto. Pt will  prescription tomorrow am at pharmacy. Discussed with pharmacy.    Needs met

## 2021-04-21 NOTE — DISCHARGE SUMMARY
[address] Patient Status:  Inpatient    10/7/1970 MRN [id number]   [facility] 7NE-A Attending Hakeem Garcia MD   Hosp Day # 0 PCP Sherrell Iyer MD     Date of Admission: 2021  Date [address] [phone]    In 2 weeks  MD visit    Appointments for Next 30 Days 4/22/2021 - 5/22/2021 Comment      Date Arrival Time Visit Type Length Department Provider     5/3/2021 10:30 AM  Ebony Louise patients inside as needed. Family/friends will be notified when your loved one is ready for pick-up. Thank you for your understanding.                     Vital signs:         --------------------------------------------------------------------------------

## 2021-04-22 ENCOUNTER — PATIENT OUTREACH (OUTPATIENT)
Dept: CASE MANAGEMENT | Age: 51
End: 2021-04-22

## 2021-04-22 DIAGNOSIS — Z02.9 ENCOUNTERS FOR ADMINISTRATIVE PURPOSE: ICD-10-CM

## 2021-04-22 NOTE — PAYOR COMM NOTE
--------------  ADMISSION REVIEW     Payor: [address] [place] [address] Drive #:  [id number]  Authorization Number: [id number]        [facility]  OBS STATUS

## 2021-04-22 NOTE — PROGRESS NOTES
Initial Post Discharge Follow Up   Discharge Date: 4/21/21  Contact Date: 4/22/2021    Consent Verification:  Assessment Completed With: Patient  HIPAA Verified?   Yes    Discharge Dx:   Bilateral pulmonary embolism    Was TCC ordered: no            Gene about your new medication? No  • Did you  your discharge medications when you left the hospital? Yes  • May I go over your medications with you to make sure we are not missing anything? yes  • Are there any reasons that keep you from taking your medi

## 2021-04-22 NOTE — PLAN OF CARE
Assumed care this pm   AOx4. RA. NSR on tele. VSS. Hep gtt stopped and dose of xarelto given to pt. Discharged pt home with wife. No further questions at this time. NURSING DISCHARGE NOTE    Discharged Home via Ambulatory.   Accompanied by Gillian Carlos

## 2021-04-28 ENCOUNTER — TELEPHONE (OUTPATIENT)
Dept: FAMILY MEDICINE CLINIC | Facility: CLINIC | Age: 51
End: 2021-04-28

## 2021-04-28 NOTE — TELEPHONE ENCOUNTER
Informed pt of Dr. Gardner Means recommendations, pt verbalized understanding and thanked this writer for the call back.

## 2021-05-03 ENCOUNTER — OFFICE VISIT (OUTPATIENT)
Dept: FAMILY MEDICINE CLINIC | Facility: CLINIC | Age: 51
End: 2021-05-03
Payer: COMMERCIAL

## 2021-05-03 VITALS
OXYGEN SATURATION: 98 % | BODY MASS INDEX: 37.37 KG/M2 | RESPIRATION RATE: 16 BRPM | HEIGHT: 70 IN | DIASTOLIC BLOOD PRESSURE: 84 MMHG | TEMPERATURE: 97 F | WEIGHT: 261 LBS | HEART RATE: 74 BPM | SYSTOLIC BLOOD PRESSURE: 130 MMHG

## 2021-05-03 DIAGNOSIS — I26.99 BILATERAL PULMONARY EMBOLISM (HCC): Primary | ICD-10-CM

## 2021-05-03 PROCEDURE — 3008F BODY MASS INDEX DOCD: CPT | Performed by: FAMILY MEDICINE

## 2021-05-03 PROCEDURE — 3079F DIAST BP 80-89 MM HG: CPT | Performed by: FAMILY MEDICINE

## 2021-05-03 PROCEDURE — 3075F SYST BP GE 130 - 139MM HG: CPT | Performed by: FAMILY MEDICINE

## 2021-05-03 PROCEDURE — 99496 TRANSJ CARE MGMT HIGH F2F 7D: CPT | Performed by: FAMILY MEDICINE

## 2021-05-03 NOTE — PATIENT INSTRUCTIONS
Thank you for choosing Hope Mercado MD at [place] [address]  To Do: [address] [facility]  1. Please take meds as directed. [facility] Nu[facility]ez is located in [address]. Monday, Tuesday & Friday – 8 a.m. to 4 p.m.   Wednesday, Thursday – 7 a.m. to 3 p.m those potential risks and we strive to make you healthier and to improve your quality of life.     Referrals, and Radiology Information:    If your insurance requires a referral to a specialist, please allow 5 business days to process your referral request. and asks about your symptoms and health history.  You may also have one or more of the following:   · Blood tests to check for blood clotting or other problems  · Imaging tests to look for clots in the veins or lung  · Electrocardiography (ECG) to test how wear elastic (compression) stockings and take breaks on long trips. Call 911  Call 911 or get emergency help if you have:   · Heavy or uncontrolled bleeding  · Symptoms of a blood clot that has traveled to the lungs. The symptoms include:  ?  Chest pain

## 2021-05-03 NOTE — PROGRESS NOTES
HPI:    Jacqueline Jimenez is a 44year old male here today for hospital follow up.    He was discharged from Inpatient hospital, [facility] to Home   Admission Date: 4/20/21   Discharge Date: 4/21/21  Hospital Discharge Diagnoses (since 4/3/2021)   None pain on his right posterior thoracic area when he breathes deeper. This is not his typical pain that he experiences on his left lumbar area. Actually he does not have any pain in his lumbar area as he has not been working.   It was also noted however that Mouth: Mucous membranes are moist.      Pharynx: Oropharynx is clear. Eyes:      General: No scleral icterus. Conjunctiva/sclera: Conjunctivae normal.      Pupils: Pupils are equal, round, and reactive to light.    Cardiovascular:      Rate and Rhyth Diagnoses and/or Management Options: moderate  · Amount and/or Complexity of Data to Be Reviewed: moderate  · Risk of Significant Complications, Morbidity, and/or Mortality: moderate    Overall Risk:   moderate    Patient seen within 7 days from [facility]

## 2021-05-06 ENCOUNTER — OFFICE VISIT (OUTPATIENT)
Dept: HEMATOLOGY/ONCOLOGY | Facility: HOSPITAL | Age: 51
End: 2021-05-06
Attending: INTERNAL MEDICINE
Payer: COMMERCIAL

## 2021-05-06 VITALS
TEMPERATURE: 97 F | SYSTOLIC BLOOD PRESSURE: 141 MMHG | WEIGHT: 260 LBS | BODY MASS INDEX: 37 KG/M2 | DIASTOLIC BLOOD PRESSURE: 88 MMHG | HEART RATE: 71 BPM | OXYGEN SATURATION: 98 % | RESPIRATION RATE: 18 BRPM

## 2021-05-06 DIAGNOSIS — D69.6 THROMBOCYTOPENIA (HCC): ICD-10-CM

## 2021-05-06 DIAGNOSIS — D75.1 ERYTHROCYTOSIS: ICD-10-CM

## 2021-05-06 DIAGNOSIS — I26.99 BILATERAL PULMONARY EMBOLISM (HCC): Primary | ICD-10-CM

## 2021-05-06 PROCEDURE — 99213 OFFICE O/P EST LOW 20 MIN: CPT | Performed by: INTERNAL MEDICINE

## 2021-05-06 NOTE — PROGRESS NOTES
Cancer Center Progress Note    Problem List:      Patient Active Problem List:     Obesity (BMI 30-39. 9)     Hard of hearing     Lumbar herniated disc     Lumbar back pain with radiculopathy affecting left lower extremity     Family history of prostate can mouth twice daily Days 22-30: 20 mg by mouth once daily, Disp: 1 each, Rfl: 0          Vital Signs:      Height: --  Weight: 117.9 kg (260 lb) (05/06 1152)  BSA (Calculated - sq m): --  Pulse: 71 (05/06 1152)  BP: 141/88 (05/06 1152)  Temp: 96.5 °F (35.8 ° upper lobe anterior segment branch, lingular branch, and basilar branches in the left lower lobe.  There also small emboli within the right right middle lobe medial branch, and basilar branches anteriorly, laterally, and posteriorly.  There is no evidence o

## 2021-05-17 ENCOUNTER — TELEPHONE (OUTPATIENT)
Dept: HEMATOLOGY/ONCOLOGY | Facility: HOSPITAL | Age: 51
End: 2021-05-17

## 2021-05-17 NOTE — TELEPHONE ENCOUNTER
Deion Martinez called asking if he is able to take sleeping medication and vitamin on his current medications.  Please call

## 2021-06-15 ENCOUNTER — TELEPHONE (OUTPATIENT)
Dept: FAMILY MEDICINE CLINIC | Facility: CLINIC | Age: 51
End: 2021-06-15

## 2021-06-15 RX ORDER — CYCLOBENZAPRINE HCL 10 MG
10 TABLET ORAL NIGHTLY PRN
Qty: 10 TABLET | Refills: 0 | Status: SHIPPED | OUTPATIENT
Start: 2021-06-15 | End: 2021-08-16 | Stop reason: ALTCHOICE

## 2021-06-15 NOTE — TELEPHONE ENCOUNTER
See phone message below:    Pt's spouse calling for new Rx for Cyclobenzaprine. Pt was seen on 1/11/21 for lumbar sprain and given Cyclobenzaprine. Pt's lower back is hurting again. Ok to send or do you want pt to schedule appointment?

## 2021-06-15 NOTE — TELEPHONE ENCOUNTER
Patient's spouse called, want you to call the patient re: muscle relaxant for R side of back, not sure what it was called, but want to get another RX for it, please advise.

## 2021-06-17 ENCOUNTER — TELEPHONE (OUTPATIENT)
Dept: FAMILY MEDICINE CLINIC | Facility: CLINIC | Age: 51
End: 2021-06-17

## 2021-06-17 NOTE — TELEPHONE ENCOUNTER
Pt states his back pain had improved and he thought it would not return so he did not follow up with the specialist for his back that he was previously referred to.  Pt states the back pain has increased and it has become difficult to get out of bed in the

## 2021-06-23 ENCOUNTER — OFFICE VISIT (OUTPATIENT)
Dept: PAIN CLINIC | Facility: CLINIC | Age: 51
End: 2021-06-23

## 2021-06-23 ENCOUNTER — HOSPITAL ENCOUNTER (OUTPATIENT)
Dept: GENERAL RADIOLOGY | Facility: HOSPITAL | Age: 51
Discharge: HOME | End: 2021-06-23
Attending: NEUROLOGICAL SURGERY
Payer: COMMERCIAL

## 2021-06-23 ENCOUNTER — OFFICE VISIT (OUTPATIENT)
Dept: SURGERY | Facility: CLINIC | Age: 51
End: 2021-06-23
Payer: COMMERCIAL

## 2021-06-23 VITALS
SYSTOLIC BLOOD PRESSURE: 124 MMHG | HEIGHT: 70 IN | DIASTOLIC BLOOD PRESSURE: 90 MMHG | WEIGHT: 260 LBS | BODY MASS INDEX: 37.22 KG/M2

## 2021-06-23 DIAGNOSIS — M47.816 LUMBAR SPONDYLOSIS: ICD-10-CM

## 2021-06-23 DIAGNOSIS — M54.59 MECHANICAL LOW BACK PAIN: ICD-10-CM

## 2021-06-23 DIAGNOSIS — M54.59 MECHANICAL LOW BACK PAIN: Primary | ICD-10-CM

## 2021-06-23 PROCEDURE — 3074F SYST BP LT 130 MM HG: CPT | Performed by: NEUROLOGICAL SURGERY

## 2021-06-23 PROCEDURE — 3080F DIAST BP >= 90 MM HG: CPT | Performed by: NEUROLOGICAL SURGERY

## 2021-06-23 PROCEDURE — 3008F BODY MASS INDEX DOCD: CPT | Performed by: NEUROLOGICAL SURGERY

## 2021-06-23 PROCEDURE — 99204 OFFICE O/P NEW MOD 45 MIN: CPT | Performed by: NEUROLOGICAL SURGERY

## 2021-06-23 PROCEDURE — 72114 X-RAY EXAM L-S SPINE BENDING: CPT | Performed by: NEUROLOGICAL SURGERY

## 2021-06-23 NOTE — PROGRESS NOTES
Ulices Sanderson is a 44year old male Acupuncture Therapy. Complaints:  1. Back pain with sciatica in the left side. Pain level 6-8     Initial Consult: Patient had pulmonary embolism.  Patient came today reporting pain in the low back that creates sciatic n

## 2021-06-23 NOTE — PROGRESS NOTES
[facility]  Neurological Surgery Clinic Note    Name: Ping Biswas  : 10/7/1970  MRN: [id number]  PCP: Dylan Araya MD    CHIEF COMPLAINT:  Left side low back pain and left leg pain    HISTORY OF PRESENT ILLNESS:  Ping Biswas conversion throughout the imaged lumbosacral spine.  This is a nonspecific finding that can be seen in the setting of hematopoietic disturbances such as anemia or smoking amongst other etiologies.      PAST MEDICAL HISTORY:  Past Medical History:   Deborrah League is normal. Able to follow simple commands. Pupils equally round and reactive to light. 3+ brisk bilaterally. EOMI. Visual fields are full. Face is symmetrical. Tongue is midline. Uvula and palate elevate symmetrically.   Shrug shoulders normally bi ease pain. Acupuncture is better at relieving the radiating leg pain that can accompany low back pain. We often recommend acupuncture because relieving pain allows you to exercise and be active.   The patient is interested in this modality; I have provided abuse). Due to their serious side effects and addictive nature, opioid medications (morphine, oxymorphone, hydromorphone, tapentadol) should be used only as a last resort when patients fail all other therapies, the [employer] advises.

## 2021-06-23 NOTE — PROGRESS NOTES
Started in 2019  Left side goes down the leg all the way down  Outer leg  N/t in thigh to knee area  Did have some PT, helped a little bit, but symptoms came back    Has had a couple of injections, this year    Has had spontaneous blood clot in lung put on

## 2021-07-03 ENCOUNTER — HOSPITAL ENCOUNTER (OUTPATIENT)
Dept: MRI IMAGING | Facility: HOSPITAL | Age: 51
Discharge: HOME | End: 2021-07-03
Attending: NEUROLOGICAL SURGERY
Payer: COMMERCIAL

## 2021-07-03 DIAGNOSIS — M54.59 MECHANICAL LOW BACK PAIN: ICD-10-CM

## 2021-07-03 DIAGNOSIS — M47.816 LUMBAR SPONDYLOSIS: ICD-10-CM

## 2021-07-03 PROCEDURE — 72148 MRI LUMBAR SPINE W/O DYE: CPT | Performed by: NEUROLOGICAL SURGERY

## 2021-07-07 ENCOUNTER — OFFICE VISIT (OUTPATIENT)
Dept: SURGERY | Facility: CLINIC | Age: 51
End: 2021-07-07
Payer: COMMERCIAL

## 2021-07-07 VITALS
SYSTOLIC BLOOD PRESSURE: 126 MMHG | BODY MASS INDEX: 37.22 KG/M2 | DIASTOLIC BLOOD PRESSURE: 90 MMHG | HEIGHT: 70 IN | WEIGHT: 260 LBS

## 2021-07-07 DIAGNOSIS — M47.26 OTHER SPONDYLOSIS WITH RADICULOPATHY, LUMBAR REGION: Primary | ICD-10-CM

## 2021-07-07 PROCEDURE — 3074F SYST BP LT 130 MM HG: CPT | Performed by: NEUROLOGICAL SURGERY

## 2021-07-07 PROCEDURE — 3080F DIAST BP >= 90 MM HG: CPT | Performed by: NEUROLOGICAL SURGERY

## 2021-07-07 PROCEDURE — 99214 OFFICE O/P EST MOD 30 MIN: CPT | Performed by: NEUROLOGICAL SURGERY

## 2021-07-07 PROCEDURE — 3008F BODY MASS INDEX DOCD: CPT | Performed by: NEUROLOGICAL SURGERY

## 2021-07-07 NOTE — PROGRESS NOTES
[facility]  Neurological Surgery Follow Up Clinic Note    Peter Ortiz 44year old, male    10/7/1970 MRN [id number]   PCP Josee Ribera MD Allergies No Known Allergies     SUBJECTIVE:  Jenniffer Alexander is here to discuss his imaging studies stenosis, or foraminal narrowing. L3-L4:  There is mild diffuse disc bulge with mild bilateral subarticular and foraminal stenosis.  There is no central canal stenosis.    L4-L5:  There is diffuse disc bulge.  There are broad-based bilateral foraminal pro

## 2021-07-21 ENCOUNTER — OFFICE VISIT (OUTPATIENT)
Dept: PAIN CLINIC | Facility: CLINIC | Age: 51
End: 2021-07-21
Payer: COMMERCIAL

## 2021-07-21 ENCOUNTER — TELEPHONE (OUTPATIENT)
Dept: PAIN CLINIC | Facility: CLINIC | Age: 51
End: 2021-07-21

## 2021-07-21 VITALS
WEIGHT: 255 LBS | HEART RATE: 75 BPM | DIASTOLIC BLOOD PRESSURE: 80 MMHG | SYSTOLIC BLOOD PRESSURE: 118 MMHG | BODY MASS INDEX: 37 KG/M2 | RESPIRATION RATE: 18 BRPM

## 2021-07-21 DIAGNOSIS — M51.26 LUMBAR HERNIATED DISC: Primary | ICD-10-CM

## 2021-07-21 DIAGNOSIS — M54.16 LUMBAR BACK PAIN WITH RADICULOPATHY AFFECTING LEFT LOWER EXTREMITY: ICD-10-CM

## 2021-07-21 PROCEDURE — 99204 OFFICE O/P NEW MOD 45 MIN: CPT | Performed by: ANESTHESIOLOGY

## 2021-07-21 PROCEDURE — 3074F SYST BP LT 130 MM HG: CPT | Performed by: ANESTHESIOLOGY

## 2021-07-21 PROCEDURE — 3079F DIAST BP 80-89 MM HG: CPT | Performed by: ANESTHESIOLOGY

## 2021-07-21 RX ORDER — ACETAMINOPHEN 500 MG
500 TABLET ORAL EVERY 6 HOURS PRN
COMMUNITY

## 2021-07-21 RX ORDER — GABAPENTIN 100 MG/1
100 CAPSULE ORAL 3 TIMES DAILY
Qty: 90 CAPSULE | Refills: 0 | Status: SHIPPED | OUTPATIENT
Start: 2021-07-21 | End: 2021-08-16

## 2021-07-21 NOTE — H&P
Name: Anahi Madsen   : 10/7/1970   DOS: 2021     Chief complaint: Low back pain    History of present illness:  Anahi Madsen is a 44year old male with a history of previous pulmonary embolism, currently anticoagulated Xarelto, referred here fo cyclobenzaprine 10 MG Oral Tab Take 1 tablet (10 mg total) by mouth nightly as needed for Muscle spasms. (Patient not taking: Reported on 6/23/2021 ) 10 tablet 0   • Rivaroxaban 20 MG Oral Tab Take 1 tablet (20 mg total) by mouth daily with food.  30 tablet level with broad-based bilateral foraminal protrusion. Assessment:  Lumbar herniated disc  (primary encounter diagnosis)  Lumbar back pain with radiculopathy affecting left lower extremity.       Plan:     The patient is a pleasant 49-year-old gentleman

## 2021-07-21 NOTE — TELEPHONE ENCOUNTER
Patient recommended for procedure with Dr. Cleveland Noriega. Patient is currently taking Xarelto managed by Dr. Luz Maria Harvey. Letter of medical clearance sent to Dr. Luz Maria Harvey requesting approval for patient to hold Xarelto for 3 days prior to recommended procedure.

## 2021-07-21 NOTE — TELEPHONE ENCOUNTER
21  RE: Monse Byrd    : 10/7/1970    Dear Dr. Justice Scott    Your patient is being scheduled for a pain management procedure at [facility] within the next 4 weeks.     Procedure:  Left lumbar 4 transforaminal epidural steroid injection  Physician

## 2021-07-21 NOTE — PROGRESS NOTES
HPI/Subjective:   Patient ID: Mendoza Porras is a 44year old male.     Consult        History/Other:   Review of Systems  Current Outpatient Medications   Medication Sig Dispense Refill   • acetaminophen 500 MG Oral Tab Take 500 mg by mouth every 6 (six) h

## 2021-07-22 ENCOUNTER — TELEPHONE (OUTPATIENT)
Dept: NEUROLOGY | Facility: CLINIC | Age: 51
End: 2021-07-22

## 2021-07-22 DIAGNOSIS — M54.16 LUMBAR RADICULOPATHY: Primary | ICD-10-CM

## 2021-07-22 NOTE — TELEPHONE ENCOUNTER
Prior authorization request completed for: Left L4 TLESI   Authorization #Notification or Prior Authorization is not required for the requested services    Decision ID #:[id number]    Authorization dates: N/A   CPT codes approved: 68269  Number of visits/d

## 2021-07-23 NOTE — TELEPHONE ENCOUNTER
Patient advised of insurance approval and medical clearance to proceed with injections and is agreeable to scheduling. Patient scheduled for procedure, pre-procedure instructions reviewed. Patient prefers local sedation.  Reviewed sedation instructions incl following medications:  • Aggrenox 10 days   • Agrylin (Anagrelide) 10 days  • Brilinta (Ticagrelor) 7 days  • Imbruvica (Ibrutinib) 3 days   • Enbrel (Etanercept) 24 hours   • Fragmin (Dalteparin) 24 hours   • Pletal (Cilostazol) 7 days  • Effient (Prasug [phone]. If you are a diabetic, please increase the frequency of your glucose monitoring after the procedure as this may cause a temporary increase in your blood sugar.   Contact your primary care physician if your blood sugar rises as you may require

## 2021-07-23 NOTE — TELEPHONE ENCOUNTER
Question Answer Comment   Anesthesia Type Local    Provider [facility]    Procedure Transforaminal    Laterality/Level left L4    Medical clearance requested (will send to Pain Navigator) No    Patient has Medicare coverage?  No    Comments (Pleas

## 2021-07-23 NOTE — TELEPHONE ENCOUNTER
[address] [place] [address] [address] [address]  [address]: [phone]  FAX: [phone]        ANTICOAGULATION CLEARANCE REQUEST     Date: 7/23/2021     Attention:  Dr. Geovani Corbett      Our mutual patient, [facility]

## 2021-07-29 ENCOUNTER — HOSPITAL ENCOUNTER (OUTPATIENT)
Facility: HOSPITAL | Age: 51
Setting detail: HOSPITAL OUTPATIENT SURGERY
Discharge: HOME | End: 2021-07-29
Attending: ANESTHESIOLOGY | Admitting: ANESTHESIOLOGY
Payer: COMMERCIAL

## 2021-07-29 ENCOUNTER — APPOINTMENT (OUTPATIENT)
Dept: GENERAL RADIOLOGY | Facility: HOSPITAL | Age: 51
End: 2021-07-29
Attending: ANESTHESIOLOGY
Payer: COMMERCIAL

## 2021-07-29 VITALS
DIASTOLIC BLOOD PRESSURE: 70 MMHG | WEIGHT: 255 LBS | RESPIRATION RATE: 18 BRPM | BODY MASS INDEX: 36.51 KG/M2 | TEMPERATURE: 98 F | SYSTOLIC BLOOD PRESSURE: 123 MMHG | HEART RATE: 63 BPM | HEIGHT: 70 IN | OXYGEN SATURATION: 99 %

## 2021-07-29 DIAGNOSIS — M54.16 LUMBAR RADICULOPATHY: ICD-10-CM

## 2021-07-29 PROCEDURE — 3E0R33Z INTRODUCTION OF ANTI-INFLAMMATORY INTO SPINAL CANAL, PERCUTANEOUS APPROACH: ICD-10-PCS | Performed by: ANESTHESIOLOGY

## 2021-07-29 PROCEDURE — 3E0R3BZ INTRODUCTION OF ANESTHETIC AGENT INTO SPINAL CANAL, PERCUTANEOUS APPROACH: ICD-10-PCS | Performed by: ANESTHESIOLOGY

## 2021-07-29 RX ORDER — LIDOCAINE HYDROCHLORIDE 10 MG/ML
INJECTION, SOLUTION EPIDURAL; INFILTRATION; INTRACAUDAL; PERINEURAL
Status: DISCONTINUED | OUTPATIENT
Start: 2021-07-29 | End: 2021-07-29

## 2021-07-29 RX ORDER — DIPHENHYDRAMINE HYDROCHLORIDE 50 MG/ML
50 INJECTION INTRAMUSCULAR; INTRAVENOUS ONCE AS NEEDED
Status: DISCONTINUED | OUTPATIENT
Start: 2021-07-29 | End: 2021-07-29

## 2021-07-29 RX ORDER — ONDANSETRON 2 MG/ML
4 INJECTION INTRAMUSCULAR; INTRAVENOUS ONCE AS NEEDED
Status: DISCONTINUED | OUTPATIENT
Start: 2021-07-29 | End: 2021-07-29

## 2021-07-29 RX ORDER — SODIUM CHLORIDE, SODIUM LACTATE, POTASSIUM CHLORIDE, CALCIUM CHLORIDE 600; 310; 30; 20 MG/100ML; MG/100ML; MG/100ML; MG/100ML
100 INJECTION, SOLUTION INTRAVENOUS CONTINUOUS
Status: DISCONTINUED | OUTPATIENT
Start: 2021-07-29 | End: 2021-07-29

## 2021-07-29 RX ORDER — SODIUM CHLORIDE 9 MG/ML
INJECTION INTRAVENOUS
Status: DISCONTINUED | OUTPATIENT
Start: 2021-07-29 | End: 2021-07-29

## 2021-07-29 RX ORDER — DEXAMETHASONE SODIUM PHOSPHATE 10 MG/ML
INJECTION, SOLUTION INTRAMUSCULAR; INTRAVENOUS
Status: DISCONTINUED | OUTPATIENT
Start: 2021-07-29 | End: 2021-07-29

## 2021-07-29 NOTE — H&P
History & Physical Examination    Patient Name: Elizabeth Natarajan  MRN: [id number]  CSN: [id number]  YOB: 1970    Pre-Operative Diagnosis:  Lumbar radiculopathy [M54.16]    Present Illness: Patient with lumbar radiculopathy for transforaminal epi Used    Alcohol use: Yes      Comment: Occasional      SYSTEM Check if Review is Normal Check if Physical Exam is Normal If not normal, please explain:   HEENT [x ] [x ]    NECK & BACK [x ] [x ]    HEART [x ] [x ]    LUNGS [x ] [x ]    ABDOMEN [x ] [x ]

## 2021-07-29 NOTE — OPERATIVE REPORT
[facility]  Operative Report  2021     Lindy Moreira Patient Status:  Hospital Outpatient Surgery    10/7/1970 MRN [id number]   Location [id number] [place] [address] Attending Shellee Habermann, MD   Hosp Day # 0 PCP Reid Park MD normal saline with 10 mg of dexamethasone was injected without complication. The needle was withdrawn with stylet in situ. The patient tolerated procedure very well. The patient was observed until discharge criteria met.   Discharge instructions were give

## 2021-08-13 ENCOUNTER — TELEPHONE (OUTPATIENT)
Dept: PAIN CLINIC | Facility: CLINIC | Age: 51
End: 2021-08-13

## 2021-08-13 NOTE — TELEPHONE ENCOUNTER
Pt calling stating that he is pain and it has not subsided since his procedure with Dr. Obed Pastor on 07/29. Pt feels that the epidural injections are not working and is looking for alternatives.  PSR offered to make a f/u appt with Ronal Gaytan on 08/16 at 10:00 AM

## 2021-08-16 ENCOUNTER — OFFICE VISIT (OUTPATIENT)
Dept: PAIN CLINIC | Facility: CLINIC | Age: 51
End: 2021-08-16
Payer: COMMERCIAL

## 2021-08-16 ENCOUNTER — TELEPHONE (OUTPATIENT)
Dept: PAIN CLINIC | Facility: CLINIC | Age: 51
End: 2021-08-16

## 2021-08-16 VITALS
WEIGHT: 255 LBS | SYSTOLIC BLOOD PRESSURE: 128 MMHG | OXYGEN SATURATION: 98 % | DIASTOLIC BLOOD PRESSURE: 86 MMHG | BODY MASS INDEX: 37 KG/M2 | HEART RATE: 90 BPM

## 2021-08-16 DIAGNOSIS — M54.16 LUMBAR RADICULITIS: Primary | ICD-10-CM

## 2021-08-16 DIAGNOSIS — M54.16 LUMBAR BACK PAIN WITH RADICULOPATHY AFFECTING LEFT LOWER EXTREMITY: ICD-10-CM

## 2021-08-16 PROCEDURE — 3074F SYST BP LT 130 MM HG: CPT | Performed by: NURSE PRACTITIONER

## 2021-08-16 PROCEDURE — 3079F DIAST BP 80-89 MM HG: CPT | Performed by: NURSE PRACTITIONER

## 2021-08-16 PROCEDURE — 99213 OFFICE O/P EST LOW 20 MIN: CPT | Performed by: NURSE PRACTITIONER

## 2021-08-16 RX ORDER — GABAPENTIN 100 MG/1
CAPSULE ORAL
Qty: 90 CAPSULE | Refills: 0 | OUTPATIENT
Start: 2021-08-16

## 2021-08-16 NOTE — PROGRESS NOTES
HPI:   Odell Jones presents for follow up from injection on 7/29/21. Patient had a left L4 TFESI under local with Dr. Christopher Akbar. This was his first injx s/p consultation being referred by Dr. Jasmine Tran for injx therapy.   Patient had previous injx in 2019 in [facility]:  Normal caliber, contour, and signal intensity.                          Impression   CONCLUSION:     1.  There is diffuse disc bulge and left foraminal protrusion at L4-L5 which abuts and displaces the exiting nerve root in the far left lateral for Outpatient Medications   Medication Sig Dispense Refill   • acetaminophen 500 MG Oral Tab Take 500 mg by mouth every 6 (six) hours as needed for Pain. • gabapentin 100 MG Oral Cap Take 1 capsule (100 mg total) by mouth 3 (three) times daily.  90 capsule years (since 2019) but recently he is not responding as favorably/80% x one week then back to baseline/he is off work due to pain/is considering SSDI/although he was not recommended to pursue this if there was a surgical intervention to help his left radic

## 2021-08-16 NOTE — TELEPHONE ENCOUNTER
Patient recommended for procedure with Dr. Davi Cuba. Patient is currently taking Xarelto managed by Dr. Lexii Trejo. Letter of medical clearance sent to Dr. Lexii Trejo requesting approval for patient to hold Xarelto for 3 days prior to recommended procedure.

## 2021-08-16 NOTE — PATIENT INSTRUCTIONS
Refill policies:    • Allow 2-3 business days for refills; controlled substances may take longer.   • Contact your pharmacy at least 5 days prior to running out of medication and have them send an electronic request or submit request through the “request re Depending on your insurance carrier, approval may take 3-10 days. It is highly recommended patients contact their insurance carrier directly to determine coverage.   If test is done without insurance authorization, patient may be responsible for the entire return to Dr. Kathy Ley to determine next course of action  • >continue physical therapy/home exercises  • >stop gabapentin due to side effects

## 2021-08-16 NOTE — TELEPHONE ENCOUNTER
21  RE: Elizabeth Natarajan    : 10/7/1970    Dear Dr. Antwon Armenta patient is being scheduled for a pain management procedure at [facility] within the next 4 weeks.     Procedure:  Left lumbar 4 transforaminal epidural steroid injection  Physici

## 2021-08-16 NOTE — PROGRESS NOTES
Last procedure: LEFT LUMBAR 4 TRANSFORAMINAL EPIDURAL STEROID INJECTION SINGLE LEVEL WITH LOCAL  Date: 7/29/21    Percentage of relief obtained: 80%  Duration of relief: 1 week    Current Pain Score: 7/10

## 2021-08-17 ENCOUNTER — TELEPHONE (OUTPATIENT)
Dept: NEUROLOGY | Facility: CLINIC | Age: 51
End: 2021-08-17

## 2021-08-17 DIAGNOSIS — M54.16 LUMBAR RADICULOPATHY: Primary | ICD-10-CM

## 2021-08-17 NOTE — TELEPHONE ENCOUNTER
Patient advised of insurance approval to proceed with injections and is agreeable to scheduling. Patient scheduled for procedure, pre-procedure instructions reviewed. Patient prefers local sedation.  Reviewed sedation instructions including no need to fast Aggrenox 10 days   • Agrylin (Anagrelide) 10 days  • Brilinta (Ticagrelor) 7 days  • Imbruvica (Ibrutinib) 3 days   • Enbrel (Etanercept) 24 hours   • Fragmin (Dalteparin) 24 hours   • Pletal (Cilostazol) 7 days  • Effient (Prasugrel) 7 days  • Pradaxa 10 diabetic, please increase the frequency of your glucose monitoring after the procedure as this may cause a temporary increase in your blood sugar. Contact your primary care physician if your blood sugar rises as you may require some medication adjustment.

## 2021-08-17 NOTE — TELEPHONE ENCOUNTER
Received faxed letter of medical clearance from Dr. Gutierrez Barajas approving patient to hold Xarelto for 3 days prior to procedure.      Letter sent to scan

## 2021-08-17 NOTE — TELEPHONE ENCOUNTER
Prior authorization request completed for: Left L4 TLESI    Authorization #Notification or Prior Authorization is not required for the requested services    Decision ID #:[id number]    Authorization dates: N/A   CPT codes approved: 41343  Number of visits/

## 2021-08-17 NOTE — TELEPHONE ENCOUNTER
Question Answer Comment   Anesthesia Type Local    Provider [facility]    Procedure Transforaminal    Laterality/Level LEFT L4    Medical clearance requested (will send to Pain Navigator) Yes Geovanny Ybarra 49   Patient has Medicare coverage?  No

## 2021-08-24 ENCOUNTER — HOSPITAL ENCOUNTER (OUTPATIENT)
Facility: HOSPITAL | Age: 51
Setting detail: HOSPITAL OUTPATIENT SURGERY
Discharge: HOME | End: 2021-08-24
Attending: ANESTHESIOLOGY | Admitting: ANESTHESIOLOGY
Payer: COMMERCIAL

## 2021-08-24 ENCOUNTER — APPOINTMENT (OUTPATIENT)
Dept: GENERAL RADIOLOGY | Facility: HOSPITAL | Age: 51
End: 2021-08-24
Attending: ANESTHESIOLOGY
Payer: COMMERCIAL

## 2021-08-24 VITALS
RESPIRATION RATE: 16 BRPM | TEMPERATURE: 98 F | OXYGEN SATURATION: 99 % | DIASTOLIC BLOOD PRESSURE: 87 MMHG | SYSTOLIC BLOOD PRESSURE: 121 MMHG | HEART RATE: 66 BPM

## 2021-08-24 DIAGNOSIS — M54.16 LUMBAR RADICULOPATHY: ICD-10-CM

## 2021-08-24 PROCEDURE — 64483 NJX AA&/STRD TFRM EPI L/S 1: CPT | Performed by: ANESTHESIOLOGY

## 2021-08-24 PROCEDURE — 3E0R33Z INTRODUCTION OF ANTI-INFLAMMATORY INTO SPINAL CANAL, PERCUTANEOUS APPROACH: ICD-10-PCS | Performed by: ANESTHESIOLOGY

## 2021-08-24 RX ORDER — LIDOCAINE HYDROCHLORIDE 10 MG/ML
INJECTION, SOLUTION EPIDURAL; INFILTRATION; INTRACAUDAL; PERINEURAL
Status: DISCONTINUED | OUTPATIENT
Start: 2021-08-24 | End: 2021-08-24

## 2021-08-24 RX ORDER — ONDANSETRON 2 MG/ML
4 INJECTION INTRAMUSCULAR; INTRAVENOUS ONCE AS NEEDED
Status: DISCONTINUED | OUTPATIENT
Start: 2021-08-24 | End: 2021-08-24

## 2021-08-24 RX ORDER — SODIUM CHLORIDE 9 MG/ML
INJECTION INTRAVENOUS
Status: DISCONTINUED | OUTPATIENT
Start: 2021-08-24 | End: 2021-08-24

## 2021-08-24 RX ORDER — DEXAMETHASONE SODIUM PHOSPHATE 10 MG/ML
INJECTION, SOLUTION INTRAMUSCULAR; INTRAVENOUS
Status: DISCONTINUED | OUTPATIENT
Start: 2021-08-24 | End: 2021-08-24

## 2021-08-24 RX ORDER — DIPHENHYDRAMINE HYDROCHLORIDE 50 MG/ML
50 INJECTION INTRAMUSCULAR; INTRAVENOUS ONCE AS NEEDED
Status: DISCONTINUED | OUTPATIENT
Start: 2021-08-24 | End: 2021-08-24

## 2021-08-24 NOTE — OPERATIVE REPORT
[facility]  Operative Report  2021     Akshat Benites Patient Status:  Hospital Outpatient Surgery    10/7/1970 MRN [id number]   Location [id number] [place] [address] Attending Elisabeth Eastman MD   Hosp Day # 0 PCP Lydia Cramer MD normal saline with 10 mg of dexamethasone was injected without complication. The needle was withdrawn with stylet in situ. The patient tolerated procedure very well. The patient was observed until discharge criteria met.   Discharge instructions were give

## 2021-08-24 NOTE — H&P
History & Physical Examination    Patient Name: Tamara Flowers  MRN: [id number]  CSN: [id number]  YOB: 1970    Pre-Operative Diagnosis:  Lumbar radiculopathy [M54.16]    Present Illness: Patient with lumbar radiculopathy for transforaminal epi Physical done within the last 30 days. Any changes noted above.     Heriberto Tolentino MD

## 2021-08-27 ENCOUNTER — OFFICE VISIT (OUTPATIENT)
Dept: HEMATOLOGY/ONCOLOGY | Age: 51
End: 2021-08-27
Attending: INTERNAL MEDICINE
Payer: COMMERCIAL

## 2021-08-27 ENCOUNTER — APPOINTMENT (OUTPATIENT)
Dept: HEMATOLOGY/ONCOLOGY | Age: 51
End: 2021-08-27
Attending: INTERNAL MEDICINE
Payer: COMMERCIAL

## 2021-08-27 VITALS
HEART RATE: 104 BPM | RESPIRATION RATE: 18 BRPM | DIASTOLIC BLOOD PRESSURE: 87 MMHG | SYSTOLIC BLOOD PRESSURE: 126 MMHG | WEIGHT: 261.31 LBS | OXYGEN SATURATION: 97 % | BODY MASS INDEX: 37 KG/M2

## 2021-08-27 DIAGNOSIS — D69.6 THROMBOCYTOPENIA (HCC): ICD-10-CM

## 2021-08-27 DIAGNOSIS — I26.99 BILATERAL PULMONARY EMBOLISM (HCC): Primary | ICD-10-CM

## 2021-08-27 DIAGNOSIS — D75.1 ERYTHROCYTOSIS: ICD-10-CM

## 2021-08-27 LAB
ALBUMIN SERPL-MCNC: 4.1 G/DL (ref 3.4–5)
ALBUMIN/GLOB SERPL: 1.1 {RATIO} (ref 1–2)
ALP LIVER SERPL-CCNC: 69 U/L
ALT SERPL-CCNC: 53 U/L
ANION GAP SERPL CALC-SCNC: 7 MMOL/L (ref 0–18)
AST SERPL-CCNC: 20 U/L (ref 15–37)
BASOPHILS # BLD AUTO: 0.08 X10(3) UL (ref 0–0.2)
BASOPHILS NFR BLD AUTO: 0.9 %
BILIRUB SERPL-MCNC: 0.6 MG/DL (ref 0.1–2)
BUN BLD-MCNC: 13 MG/DL (ref 7–18)
CALCIUM BLD-MCNC: 8.8 MG/DL (ref 8.5–10.1)
CHLORIDE SERPL-SCNC: 106 MMOL/L (ref 98–112)
CO2 SERPL-SCNC: 23 MMOL/L (ref 21–32)
CREAT BLD-MCNC: 1.12 MG/DL
D-DIMER: <0.27 UG/ML FEU (ref ?–0.52)
EOSINOPHIL # BLD AUTO: 0.08 X10(3) UL (ref 0–0.7)
EOSINOPHIL NFR BLD AUTO: 0.9 %
ERYTHROCYTE [DISTWIDTH] IN BLOOD BY AUTOMATED COUNT: 14.2 %
GLOBULIN PLAS-MCNC: 3.7 G/DL (ref 2.8–4.4)
GLUCOSE BLD-MCNC: 83 MG/DL (ref 70–99)
HCT VFR BLD AUTO: 55.8 %
HGB BLD-MCNC: 19.5 G/DL
IMM GRANULOCYTES # BLD AUTO: 0.08 X10(3) UL (ref 0–1)
IMM GRANULOCYTES NFR BLD: 0.9 %
LDH SERPL L TO P-CCNC: 221 U/L
LYMPHOCYTES # BLD AUTO: 2.65 X10(3) UL (ref 1–4)
LYMPHOCYTES NFR BLD AUTO: 31.3 %
M PROTEIN MFR SERPL ELPH: 7.8 G/DL (ref 6.4–8.2)
MCH RBC QN AUTO: 29.9 PG (ref 26–34)
MCHC RBC AUTO-ENTMCNC: 34.9 G/DL (ref 31–37)
MCV RBC AUTO: 85.6 FL
MONOCYTES # BLD AUTO: 0.83 X10(3) UL (ref 0.1–1)
MONOCYTES NFR BLD AUTO: 9.8 %
NEUTROPHILS # BLD AUTO: 4.75 X10 (3) UL (ref 1.5–7.7)
NEUTROPHILS # BLD AUTO: 4.75 X10(3) UL (ref 1.5–7.7)
NEUTROPHILS NFR BLD AUTO: 56.2 %
OSMOLALITY SERPL CALC.SUM OF ELEC: 281 MOSM/KG (ref 275–295)
PATIENT FASTING Y/N/NP: NO
PLATELET # BLD AUTO: 160 10(3)UL (ref 150–450)
POTASSIUM SERPL-SCNC: 3.9 MMOL/L (ref 3.5–5.1)
RBC # BLD AUTO: 6.52 X10(6)UL
SODIUM SERPL-SCNC: 136 MMOL/L (ref 136–145)
WBC # BLD AUTO: 8.5 X10(3) UL (ref 4–11)

## 2021-08-27 PROCEDURE — 99214 OFFICE O/P EST MOD 30 MIN: CPT | Performed by: INTERNAL MEDICINE

## 2021-08-27 NOTE — PROGRESS NOTES
Cancer Center Progress Note    Problem List:      Patient Active Problem List:     Obesity (BMI 30-39. 9)     Hard of hearing     Lumbar herniated disc     Lumbar back pain with radiculopathy affecting left lower extremity     Family history of prostate can • Hypertension Father    • Diabetes Father    • Lipids Father    • Cancer Father         prostate   • Cancer Brother         one brother-colon, other brother-prostate       Allergies:       Gabapentin              OTHER (SEE COMMENTS)    Comment:VISUAL C venous segments bilaterally.  There is observed phasicity and augmentation.  Flow is demonstrated in the posterior tibial veins. Impression   CONCLUSION:  Negative DVT study.       CTA chest on 4/20/2021:  FINDINGS:     VASCULATURE: Rima Rosenberg is a moderat after obtaining the above work up.     3. Thrombocytopenia:     Normal now.       Hardik Mccain MD

## 2021-08-27 NOTE — PROGRESS NOTES
Pt here for 3 month MD f/u. Energy level and appetite has been good. Pt notes pain from PE has resolved, has sciatic pain. Continues on Xarelto.      Outpatient Oncology Care Plan  Problem list:  fatigue  knowledge deficit    Problems related to:    disease

## 2021-08-29 LAB — ERYTHROPOIETIN (EPO): 14 MU/ML

## 2021-08-31 ENCOUNTER — TELEPHONE (OUTPATIENT)
Dept: HEMATOLOGY/ONCOLOGY | Facility: HOSPITAL | Age: 51
End: 2021-08-31

## 2021-08-31 NOTE — TELEPHONE ENCOUNTER
Spoke with patient - notified all lab results are not back - patient would like call from Dr. Paula Guerrero when labs are complete.

## 2021-09-01 ENCOUNTER — TELEPHONE (OUTPATIENT)
Dept: PAIN CLINIC | Facility: CLINIC | Age: 51
End: 2021-09-01

## 2021-09-01 ENCOUNTER — TELEPHONE (OUTPATIENT)
Dept: FAMILY MEDICINE CLINIC | Facility: CLINIC | Age: 51
End: 2021-09-01

## 2021-09-01 DIAGNOSIS — M54.16 LUMBAR BACK PAIN WITH RADICULOPATHY AFFECTING LEFT LOWER EXTREMITY: Primary | ICD-10-CM

## 2021-09-01 RX ORDER — METHYLPREDNISOLONE 4 MG/1
TABLET ORAL
Qty: 1 EACH | Refills: 0 | Status: SHIPPED | OUTPATIENT
Start: 2021-09-01 | End: 2021-09-15

## 2021-09-01 NOTE — TELEPHONE ENCOUNTER
Left TLESI on 8/24 and 7/29. Pt reports he completed an epidural with another physician in March of 2021 as well. Patient calling with pain to: Lower back, radiating down left leg, left foot is N/T.   Pt reports that he did not experience any relief fo to determine next course of action  · >continue physical therapy/home exercises  · >stop gabapentin due to side effects        Orders Placed This Encounter      [address] for this Visit:  Requested Prescriptions        No pre

## 2021-09-01 NOTE — TELEPHONE ENCOUNTER
Rylie Butcher MD  You 45 minutes ago (1:20 PM)     We can try a medrol dosepak. I would follow up with Dr Vincent Pak to discuss microdisectomy. Contacted pt to relay info above. Pt agreeable to dose eddie and to scheduling with Dr Vincent Pak.  Call transfer

## 2021-09-01 NOTE — TELEPHONE ENCOUNTER
Pt called stated he is in a lot of pain. States this is his third injection and would like to know when is the medication going to kick in.     Please advise

## 2021-09-05 LAB — JAK2 GENE, V617F MUTATION, QUALITATIVE: NOT DETECTED

## 2021-09-07 LAB — JAK2 EXON 12 MUTATION ANAL PCR: NOT DETECTED

## 2021-09-09 DIAGNOSIS — D75.1 ERYTHROCYTOSIS: ICD-10-CM

## 2021-09-09 DIAGNOSIS — I26.99 BILATERAL PULMONARY EMBOLISM (HCC): Primary | ICD-10-CM

## 2021-09-15 ENCOUNTER — HOSPITAL ENCOUNTER (OUTPATIENT)
Dept: CT IMAGING | Facility: HOSPITAL | Age: 51
Discharge: HOME | End: 2021-09-15
Attending: INTERNAL MEDICINE
Payer: COMMERCIAL

## 2021-09-15 ENCOUNTER — TELEPHONE (OUTPATIENT)
Dept: SURGERY | Facility: CLINIC | Age: 51
End: 2021-09-15

## 2021-09-15 ENCOUNTER — OFFICE VISIT (OUTPATIENT)
Dept: SURGERY | Facility: CLINIC | Age: 51
End: 2021-09-15
Payer: COMMERCIAL

## 2021-09-15 VITALS — BODY MASS INDEX: 37 KG/M2 | DIASTOLIC BLOOD PRESSURE: 74 MMHG | WEIGHT: 261 LBS | SYSTOLIC BLOOD PRESSURE: 128 MMHG

## 2021-09-15 DIAGNOSIS — I26.99 BILATERAL PULMONARY EMBOLISM (HCC): ICD-10-CM

## 2021-09-15 DIAGNOSIS — D75.1 ERYTHROCYTOSIS: Primary | ICD-10-CM

## 2021-09-15 DIAGNOSIS — M54.59 MECHANICAL LOW BACK PAIN: ICD-10-CM

## 2021-09-15 DIAGNOSIS — M47.26 OTHER SPONDYLOSIS WITH RADICULOPATHY, LUMBAR REGION: Primary | ICD-10-CM

## 2021-09-15 DIAGNOSIS — D69.6 THROMBOCYTOPENIA (HCC): ICD-10-CM

## 2021-09-15 DIAGNOSIS — D75.1 ERYTHROCYTOSIS: ICD-10-CM

## 2021-09-15 PROCEDURE — 3074F SYST BP LT 130 MM HG: CPT | Performed by: NEUROLOGICAL SURGERY

## 2021-09-15 PROCEDURE — 99213 OFFICE O/P EST LOW 20 MIN: CPT | Performed by: NEUROLOGICAL SURGERY

## 2021-09-15 PROCEDURE — 74177 CT ABD & PELVIS W/CONTRAST: CPT | Performed by: INTERNAL MEDICINE

## 2021-09-15 PROCEDURE — 3078F DIAST BP <80 MM HG: CPT | Performed by: NEUROLOGICAL SURGERY

## 2021-09-15 RX ORDER — TRAMADOL HYDROCHLORIDE 50 MG/1
50 TABLET ORAL
Qty: 60 TABLET | Refills: 0 | Status: SHIPPED | OUTPATIENT
Start: 2021-09-15 | End: 2021-09-28

## 2021-09-15 NOTE — PATIENT INSTRUCTIONS
You are scheduled for lumbar laminectomy, 2 levels on  with . · PCP clearance is needed. We have faxed a request for pre-op clearance to you PCP Dr. Fidel Jackson. Please contact their office for appointment.   ·   · You will need  pre operative la surgery through your insurance. ·  Surgery is usually scheduled as 24 hour admission. ·   · The hospital will contact you 1-2 days before surgery with your arrival time.    ·   · If you were on blood thinners (such as Coumadin, Pradaxa, Xarelto, etc) prio the [facility], check in at registration and meet by the fish tank in the [employer] for your escort to class on the Ortho Spine unit. If unable to attend, class is available online at [email or web address].  Please call the Care Coord covering physicians do not authorize routine medications on weekends. • No narcotics or controlled substances are refilled after noon on Fridays or by on call physicians. • By law, narcotics cannot be faxed or phoned into your pharmacy.  The prescription has recommended that you have a procedure or additional testing performed. [facility]) will contact your insurance carrier to obtain pre-certification or prior authorization.     Unfortunately, [facility] has seen an increase in denial of

## 2021-09-15 NOTE — PROGRESS NOTES
Things are not getting better  Had 2 epidurals since he has been here  Either injection didn't get any relief  Maybe a few days of relief    Had been given a steroid pack previously and that gave him more relief than the injections

## 2021-09-15 NOTE — PROGRESS NOTES
[facility]  Neurological Surgery Follow Up Clinic Note    Eduar Stack 44year old, male    10/7/1970 MRN [id number]   PCP Peña Shrestha MD Allergies   Gabapentin              OTHER (SEE COMMENTS)    Comment:VISUAL CHANGES AND URI should improve weeks to months after surgery. The risk include bleeding infection CSF leak better to correct the problem and need for further surgeries. 2. The patient will need to be off of Xarelto 10 days prior to surgery.     3. I have prescribed Ult

## 2021-09-15 NOTE — TELEPHONE ENCOUNTER
Patient is scheduled for LUMBAR LAMINECTOMY 2 LEVEL, lumbar three-four and lumbar four-five on 10/21/21 with Dr Stefanie Silva.  form completed Y    Surgery order signed Melody Wang on surgery sheet Y    Placed on outlook calendar Y  Stylehive message sent to patient with sx instructions Y    Faxed pre-op clearance request to PCP Dr. Waldo Meckel and Dr. Andry Ray onc for Rivaroxaban 20 MG Oral Tab  recommendations for holding med. Y     Post-op appointments made Y    PA . Routed to PA team to initiate. Y    3 day follow up reminder placed.

## 2021-09-24 ENCOUNTER — LABORATORY ENCOUNTER (OUTPATIENT)
Dept: LAB | Facility: HOSPITAL | Age: 51
End: 2021-09-24
Attending: NEUROLOGICAL SURGERY
Payer: COMMERCIAL

## 2021-09-24 DIAGNOSIS — M47.26 OTHER SPONDYLOSIS WITH RADICULOPATHY, LUMBAR REGION: ICD-10-CM

## 2021-09-24 LAB
APTT PPP: 39.4 SECONDS (ref 25.4–36.1)
ERYTHROCYTE [DISTWIDTH] IN BLOOD BY AUTOMATED COUNT: 13.9 %
HCT VFR BLD AUTO: 58.2 %
HGB BLD-MCNC: 19.5 G/DL
INR BLD: 1.15 (ref 0.89–1.11)
MCH RBC QN AUTO: 28.9 PG (ref 26–34)
MCHC RBC AUTO-ENTMCNC: 33.5 G/DL (ref 31–37)
MCV RBC AUTO: 86.4 FL
PLATELET # BLD AUTO: 169 10(3)UL (ref 150–450)
PROTHROMBIN TIME: 15 SECONDS (ref 12.2–14.5)
RBC # BLD AUTO: 6.74 X10(6)UL
WBC # BLD AUTO: 8.5 X10(3) UL (ref 4–11)

## 2021-09-24 PROCEDURE — 87081 CULTURE SCREEN ONLY: CPT

## 2021-09-24 PROCEDURE — 36415 COLL VENOUS BLD VENIPUNCTURE: CPT

## 2021-09-24 PROCEDURE — 85027 COMPLETE CBC AUTOMATED: CPT

## 2021-09-24 PROCEDURE — 85730 THROMBOPLASTIN TIME PARTIAL: CPT

## 2021-09-24 PROCEDURE — 85610 PROTHROMBIN TIME: CPT

## 2021-09-24 RX ORDER — ACETAMINOPHEN 500 MG
1000 TABLET ORAL ONCE
Status: CANCELLED | OUTPATIENT
Start: 2021-09-24 | End: 2021-09-24

## 2021-09-28 ENCOUNTER — HOSPITAL ENCOUNTER (OUTPATIENT)
Dept: GENERAL RADIOLOGY | Age: 51
Discharge: HOME | End: 2021-09-28
Attending: FAMILY MEDICINE
Payer: COMMERCIAL

## 2021-09-28 ENCOUNTER — LAB ENCOUNTER (OUTPATIENT)
Dept: LAB | Age: 51
End: 2021-09-28
Attending: FAMILY MEDICINE
Payer: COMMERCIAL

## 2021-09-28 ENCOUNTER — OFFICE VISIT (OUTPATIENT)
Dept: FAMILY MEDICINE CLINIC | Facility: CLINIC | Age: 51
End: 2021-09-28
Payer: COMMERCIAL

## 2021-09-28 VITALS
HEART RATE: 88 BPM | HEIGHT: 70 IN | WEIGHT: 258 LBS | SYSTOLIC BLOOD PRESSURE: 126 MMHG | RESPIRATION RATE: 16 BRPM | BODY MASS INDEX: 36.94 KG/M2 | DIASTOLIC BLOOD PRESSURE: 72 MMHG

## 2021-09-28 DIAGNOSIS — M25.552 LEFT HIP PAIN: ICD-10-CM

## 2021-09-28 DIAGNOSIS — R36.1 BLOOD IN SEMEN: ICD-10-CM

## 2021-09-28 DIAGNOSIS — R36.1 BLOOD IN SEMEN: Primary | ICD-10-CM

## 2021-09-28 PROCEDURE — 3008F BODY MASS INDEX DOCD: CPT | Performed by: FAMILY MEDICINE

## 2021-09-28 PROCEDURE — 73502 X-RAY EXAM HIP UNI 2-3 VIEWS: CPT | Performed by: FAMILY MEDICINE

## 2021-09-28 PROCEDURE — 3078F DIAST BP <80 MM HG: CPT | Performed by: FAMILY MEDICINE

## 2021-09-28 PROCEDURE — 81001 URINALYSIS AUTO W/SCOPE: CPT

## 2021-09-28 PROCEDURE — 99214 OFFICE O/P EST MOD 30 MIN: CPT | Performed by: FAMILY MEDICINE

## 2021-09-28 PROCEDURE — 3074F SYST BP LT 130 MM HG: CPT | Performed by: FAMILY MEDICINE

## 2021-09-28 NOTE — PROGRESS NOTES
Subjective:   Patient ID: Jamin Clemente is a 44year old male.     HPI  Mr. Jocelyn Wayne is a pleasant 77-year-old gentleman with known history of pulmonary embolism on Xarelto, lumbar degenerative joint disease who is scheduled to undergo spine surgery on [facility] oropharyngeal exudate. Eyes:      General: No scleral icterus. Conjunctiva/sclera: Conjunctivae normal.      Pupils: Pupils are equal, round, and reactive to light. Cardiovascular:      Rate and Rhythm: Normal rate and regular rhythm.       Heart so Visit:  Requested Prescriptions      No prescriptions requested or ordered in this encounter       Imaging & Referrals:  XR HIP + PELVIS MIN 4 VIEWS LEFT (CPT=73503)

## 2021-09-28 NOTE — PATIENT INSTRUCTIONS
Thank you for choosing Emi Anthony MD at [place] [address]  To Do: Kinsey Flannery  1. Please have labs done as ordered  [facility] is located in [address]. Monday, Tuesday & Friday – 8 a.m. to 4 p.m.   Wednesday, Thursday – 7 a.m. to 3 p.m those potential risks and we strive to make you healthier and to improve your quality of life.     Referrals, and Radiology Information:    If your insurance requires a referral to a specialist, please allow 5 business days to process your referral request.

## 2021-09-30 ENCOUNTER — LAB ENCOUNTER (OUTPATIENT)
Dept: LAB | Age: 51
End: 2021-09-30
Attending: FAMILY MEDICINE
Payer: COMMERCIAL

## 2021-09-30 DIAGNOSIS — R31.9 HEMATURIA, UNSPECIFIED TYPE: ICD-10-CM

## 2021-09-30 PROCEDURE — 87086 URINE CULTURE/COLONY COUNT: CPT

## 2021-10-04 NOTE — TELEPHONE ENCOUNTER
Prior Authorization for outptient surgery initiated with Brad Suarez at [place] [address]  Requesting coverage for:Lumbar Laminectomy 2 level  Date of Service: 10/21/21   Inpatient days requested:0 outpatient  CPT codes: 61605,52795    Request for surgery pending review, pending reference #[id number].  Clinical notes uploaded on [facility]

## 2021-10-11 ENCOUNTER — TELEPHONE (OUTPATIENT)
Dept: SURGERY | Facility: CLINIC | Age: 51
End: 2021-10-11

## 2021-10-11 NOTE — TELEPHONE ENCOUNTER
RN asked if patient needs the CMP drawn again as he had one on 8-27-21. Told her labs are good for 60 days. Patient has pre-op apt with Dr Silvina Berkowitz on 10-13. Will check on status at that time.

## 2021-10-11 NOTE — TELEPHONE ENCOUNTER
Office has not received PCP clearance or clearance to hold xarelto. Re-faxed clearance requests. Patient did see his PCP on 9-28 but for an acute medical issue, no notes about pre-op clearance.

## 2021-10-12 NOTE — TELEPHONE ENCOUNTER
Prior authorization request completed for:Lumbar Laminectomy   Authorization #[id number]  Authorization dates: 10/21/21 (10/21/21-1/19/22)  CPT codes approved:38580,09272  Number of visits/dates of service approved: 1  Physician:Dr Patrick Muñoz

## 2021-10-13 ENCOUNTER — OFFICE VISIT (OUTPATIENT)
Dept: FAMILY MEDICINE CLINIC | Facility: CLINIC | Age: 51
End: 2021-10-13
Payer: COMMERCIAL

## 2021-10-13 ENCOUNTER — TELEPHONE (OUTPATIENT)
Dept: FAMILY MEDICINE CLINIC | Facility: CLINIC | Age: 51
End: 2021-10-13

## 2021-10-13 VITALS
DIASTOLIC BLOOD PRESSURE: 80 MMHG | TEMPERATURE: 98 F | SYSTOLIC BLOOD PRESSURE: 110 MMHG | WEIGHT: 260 LBS | HEART RATE: 84 BPM | OXYGEN SATURATION: 98 % | HEIGHT: 70 IN | BODY MASS INDEX: 37.22 KG/M2 | RESPIRATION RATE: 16 BRPM

## 2021-10-13 DIAGNOSIS — Z01.818 PREOPERATIVE CLEARANCE: Primary | ICD-10-CM

## 2021-10-13 DIAGNOSIS — G47.9 SLEEP DISTURBANCE: ICD-10-CM

## 2021-10-13 DIAGNOSIS — M54.16 LUMBAR BACK PAIN WITH RADICULOPATHY AFFECTING LEFT LOWER EXTREMITY: ICD-10-CM

## 2021-10-13 PROCEDURE — 3074F SYST BP LT 130 MM HG: CPT | Performed by: FAMILY MEDICINE

## 2021-10-13 PROCEDURE — 3079F DIAST BP 80-89 MM HG: CPT | Performed by: FAMILY MEDICINE

## 2021-10-13 PROCEDURE — 99214 OFFICE O/P EST MOD 30 MIN: CPT | Performed by: FAMILY MEDICINE

## 2021-10-13 PROCEDURE — 3008F BODY MASS INDEX DOCD: CPT | Performed by: FAMILY MEDICINE

## 2021-10-13 RX ORDER — ZOLPIDEM TARTRATE 5 MG/1
5 TABLET ORAL NIGHTLY PRN
Qty: 30 TABLET | Refills: 0 | Status: SHIPPED | OUTPATIENT
Start: 2021-10-13 | End: 2021-12-29 | Stop reason: ALTCHOICE

## 2021-10-13 NOTE — TELEPHONE ENCOUNTER
Pre op note and labs faxed to Dr Martina Blackburn office at [phone] and pre-admissions at [phone].  Packet placed in my pre op ppw bin

## 2021-10-13 NOTE — TELEPHONE ENCOUNTER
Received Presurgical Clearance paperwork from Dr Darnell Jones.   Endorsed to [place]

## 2021-10-13 NOTE — PATIENT INSTRUCTIONS
Thank you for choosing Krzysztof Wang MD at [place] [address]  To Do: Odell Jones  1. Considerations in the Preoperative period:   Surgery is a big deal.  Anesthesia and your medicines and medical issues all stress out your body.   Therefore read abo reuptake inhibitors like zoloft, effexor, paxil, prozac, citalopram, remeron etc. For mood should be held 3 weeks before surgery if high risk of bleeding as these may increase risk of bleeding  Most other psychiatric meds like antipsychotic meds, anxiety m done so.  All your results will post on there.  [email or web address][email or web address]/  • You can NOW use [employer]Holidays to book your appointments with us, or consider using open access scheduling which is through the edward website [email or web address] org and type i please call Central Scheduling at [phone]  Please allow our office 5 business days to contact you regarding any testing results.     Refill policies:   Allow 3 business days for refills; controlled substances may take longer and must be picked up from

## 2021-10-13 NOTE — H&P
Preoperative History and Physical Internal Medicine    CC: Patient presents with:  Pre-Op Exam: lumbar laminectomy 2 level/ L3,4 and 5/ Dr Luann Contreras 10/21/21  Insomnia: states that he is having a hard time sleeping-tried unisom and melatonin with no relief 1/5/2019    Procedure: COLONOSCOPY;  Surgeon: Ray Deleon MD;  Location: [address] [place] [address] ENDOSCOPY   • WISDOM TEETH REMOVED         Social History:  Social History    Tobacco Use      Smoking status: Never Smoker      Smokeless tobacco: Never Used    Vaping U Mouth/Throat:      Mouth: Mucous membranes are moist.      Pharynx: Oropharynx is clear. No oropharyngeal exudate. Eyes:      General: No scleral icterus.      Conjunctiva/sclera: Conjunctivae normal.      Pupils: Pupils are equal, round, and reactive to 09/28/2021 Few*   • Renal Tubular Epithelial* 09/28/2021 None Seen    • Transitional Cells 09/28/2021 None Seen    • Yeast Urine 09/28/2021 None Seen    Laboratory Encounter on 09/24/2021   Component Date Value   • PT 09/24/2021 15.0*   • INR 09/24/2021 1. barriers to learning. Medical education done. Outcome: Patient verbalizes understanding.     Orders Placed This Encounter      Flulaval 6 months and older 0.5 ml PFS [12096]    Orders Placed This Encounter      Diagnostic Sleep Study-split night PAP impleme

## 2021-10-14 NOTE — TELEPHONE ENCOUNTER
Per PCP Dr. Elida Lockett 10/13/21    \"Preoperative clearance  (primary encounter diagnosis)  Lumbar back pain with radiculopathy affecting left lower extremity  -Patient is medically cleared to undergo planned procedure. He is at low risk for developing perioperative and postoperative cardiac complications. Please contact my office if questions or concerns. He was given directions on when to stop Xarelto. Patient was advised to hold any , anti-inflammatories, or supplements that patient may be taking at least 7-10 days prior to procedure. I will forward my notes to [address] [place] [address] for review. \"     PCP clearance received with Xarelto directions

## 2021-10-18 ENCOUNTER — LAB ENCOUNTER (OUTPATIENT)
Dept: LAB | Facility: HOSPITAL | Age: 51
End: 2021-10-18
Attending: NEUROLOGICAL SURGERY
Payer: COMMERCIAL

## 2021-10-18 DIAGNOSIS — M47.26 OTHER SPONDYLOSIS WITH RADICULOPATHY, LUMBAR REGION: ICD-10-CM

## 2021-10-20 ENCOUNTER — ANESTHESIA EVENT (OUTPATIENT)
Dept: SURGERY | Facility: HOSPITAL | Age: 51
End: 2021-10-20
Payer: COMMERCIAL

## 2021-10-21 ENCOUNTER — APPOINTMENT (OUTPATIENT)
Dept: GENERAL RADIOLOGY | Facility: HOSPITAL | Age: 51
End: 2021-10-21
Attending: NEUROLOGICAL SURGERY
Payer: COMMERCIAL

## 2021-10-21 ENCOUNTER — ANESTHESIA (OUTPATIENT)
Dept: SURGERY | Facility: HOSPITAL | Age: 51
End: 2021-10-21
Payer: COMMERCIAL

## 2021-10-21 ENCOUNTER — HOSPITAL ENCOUNTER (OUTPATIENT)
Facility: HOSPITAL | Age: 51
Discharge: HOME | End: 2021-10-22
Attending: NEUROLOGICAL SURGERY | Admitting: NEUROLOGICAL SURGERY
Payer: COMMERCIAL

## 2021-10-21 DIAGNOSIS — M47.26 OTHER SPONDYLOSIS WITH RADICULOPATHY, LUMBAR REGION: Primary | ICD-10-CM

## 2021-10-21 PROBLEM — I26.99 PULMONARY EMBOLUS (HCC): Status: ACTIVE | Noted: 2021-04-20

## 2021-10-21 PROCEDURE — 76000 FLUOROSCOPY <1 HR PHYS/QHP: CPT | Performed by: NEUROLOGICAL SURGERY

## 2021-10-21 PROCEDURE — 3008F BODY MASS INDEX DOCD: CPT | Performed by: INTERNAL MEDICINE

## 2021-10-21 PROCEDURE — 01NB0ZZ RELEASE LUMBAR NERVE, OPEN APPROACH: ICD-10-PCS | Performed by: NEUROLOGICAL SURGERY

## 2021-10-21 PROCEDURE — 3077F SYST BP >= 140 MM HG: CPT | Performed by: INTERNAL MEDICINE

## 2021-10-21 PROCEDURE — 99225 SUBSEQUENT OBSERVATION CARE: CPT | Performed by: INTERNAL MEDICINE

## 2021-10-21 PROCEDURE — 3080F DIAST BP >= 90 MM HG: CPT | Performed by: INTERNAL MEDICINE

## 2021-10-21 DEVICE — KIT TISS CLOS TISSEEL 4ML: Type: IMPLANTABLE DEVICE | Site: BACK | Status: FUNCTIONAL

## 2021-10-21 DEVICE — COLLAGEN DURAL REGENERATION MEMBRANE 1IN X 3IN (2.5CM X 7.5CM)
Type: IMPLANTABLE DEVICE | Site: BACK | Status: FUNCTIONAL
Brand: DURAMATRIX-ONLAY PLUS

## 2021-10-21 RX ORDER — CEFAZOLIN SODIUM/WATER 2 G/20 ML
2 SYRINGE (ML) INTRAVENOUS ONCE
Status: DISCONTINUED | OUTPATIENT
Start: 2021-10-21 | End: 2021-10-21 | Stop reason: HOSPADM

## 2021-10-21 RX ORDER — GLYCOPYRROLATE 0.2 MG/ML
INJECTION, SOLUTION INTRAMUSCULAR; INTRAVENOUS AS NEEDED
Status: DISCONTINUED | OUTPATIENT
Start: 2021-10-21 | End: 2021-10-21 | Stop reason: SURG

## 2021-10-21 RX ORDER — HYDROCODONE BITARTRATE AND ACETAMINOPHEN 5; 325 MG/1; MG/1
2 TABLET ORAL AS NEEDED
Status: DISCONTINUED | OUTPATIENT
Start: 2021-10-21 | End: 2021-10-21 | Stop reason: HOSPADM

## 2021-10-21 RX ORDER — POLYETHYLENE GLYCOL 3350 17 G/17G
17 POWDER, FOR SOLUTION ORAL DAILY PRN
Status: DISCONTINUED | OUTPATIENT
Start: 2021-10-21 | End: 2021-10-22

## 2021-10-21 RX ORDER — NALOXONE HYDROCHLORIDE 0.4 MG/ML
80 INJECTION, SOLUTION INTRAMUSCULAR; INTRAVENOUS; SUBCUTANEOUS AS NEEDED
Status: DISCONTINUED | OUTPATIENT
Start: 2021-10-21 | End: 2021-10-21 | Stop reason: HOSPADM

## 2021-10-21 RX ORDER — MEPERIDINE HYDROCHLORIDE 25 MG/ML
12.5 INJECTION INTRAMUSCULAR; INTRAVENOUS; SUBCUTANEOUS AS NEEDED
Status: DISCONTINUED | OUTPATIENT
Start: 2021-10-21 | End: 2021-10-21 | Stop reason: HOSPADM

## 2021-10-21 RX ORDER — KETOROLAC TROMETHAMINE 30 MG/ML
30 INJECTION, SOLUTION INTRAMUSCULAR; INTRAVENOUS EVERY 6 HOURS
Status: COMPLETED | OUTPATIENT
Start: 2021-10-21 | End: 2021-10-21

## 2021-10-21 RX ORDER — HYDROMORPHONE HYDROCHLORIDE 1 MG/ML
0.8 INJECTION, SOLUTION INTRAMUSCULAR; INTRAVENOUS; SUBCUTANEOUS EVERY 2 HOUR PRN
Status: DISCONTINUED | OUTPATIENT
Start: 2021-10-21 | End: 2021-10-22

## 2021-10-21 RX ORDER — SODIUM PHOSPHATE, DIBASIC AND SODIUM PHOSPHATE, MONOBASIC 7; 19 G/133ML; G/133ML
1 ENEMA RECTAL ONCE AS NEEDED
Status: DISCONTINUED | OUTPATIENT
Start: 2021-10-21 | End: 2021-10-22

## 2021-10-21 RX ORDER — CEFAZOLIN SODIUM/WATER 2 G/20 ML
2 SYRINGE (ML) INTRAVENOUS EVERY 8 HOURS
Status: COMPLETED | OUTPATIENT
Start: 2021-10-21 | End: 2021-10-22

## 2021-10-21 RX ORDER — ONDANSETRON 2 MG/ML
4 INJECTION INTRAMUSCULAR; INTRAVENOUS EVERY 4 HOURS PRN
Status: DISCONTINUED | OUTPATIENT
Start: 2021-10-21 | End: 2021-10-22

## 2021-10-21 RX ORDER — SODIUM CHLORIDE, SODIUM LACTATE, POTASSIUM CHLORIDE, CALCIUM CHLORIDE 600; 310; 30; 20 MG/100ML; MG/100ML; MG/100ML; MG/100ML
INJECTION, SOLUTION INTRAVENOUS CONTINUOUS
Status: DISCONTINUED | OUTPATIENT
Start: 2021-10-21 | End: 2021-10-22

## 2021-10-21 RX ORDER — SODIUM CHLORIDE, SODIUM LACTATE, POTASSIUM CHLORIDE, CALCIUM CHLORIDE 600; 310; 30; 20 MG/100ML; MG/100ML; MG/100ML; MG/100ML
INJECTION, SOLUTION INTRAVENOUS CONTINUOUS
Status: DISCONTINUED | OUTPATIENT
Start: 2021-10-21 | End: 2021-10-21 | Stop reason: HOSPADM

## 2021-10-21 RX ORDER — ONDANSETRON 2 MG/ML
4 INJECTION INTRAMUSCULAR; INTRAVENOUS AS NEEDED
Status: DISCONTINUED | OUTPATIENT
Start: 2021-10-21 | End: 2021-10-21 | Stop reason: HOSPADM

## 2021-10-21 RX ORDER — HYDROMORPHONE HYDROCHLORIDE 1 MG/ML
0.4 INJECTION, SOLUTION INTRAMUSCULAR; INTRAVENOUS; SUBCUTANEOUS EVERY 5 MIN PRN
Status: DISCONTINUED | OUTPATIENT
Start: 2021-10-21 | End: 2021-10-21 | Stop reason: HOSPADM

## 2021-10-21 RX ORDER — HYDROMORPHONE HYDROCHLORIDE 1 MG/ML
0.4 INJECTION, SOLUTION INTRAMUSCULAR; INTRAVENOUS; SUBCUTANEOUS EVERY 2 HOUR PRN
Status: DISCONTINUED | OUTPATIENT
Start: 2021-10-21 | End: 2021-10-22

## 2021-10-21 RX ORDER — METOCLOPRAMIDE HYDROCHLORIDE 5 MG/ML
INJECTION INTRAMUSCULAR; INTRAVENOUS AS NEEDED
Status: DISCONTINUED | OUTPATIENT
Start: 2021-10-21 | End: 2021-10-21 | Stop reason: SURG

## 2021-10-21 RX ORDER — ONDANSETRON 2 MG/ML
INJECTION INTRAMUSCULAR; INTRAVENOUS AS NEEDED
Status: DISCONTINUED | OUTPATIENT
Start: 2021-10-21 | End: 2021-10-21 | Stop reason: SURG

## 2021-10-21 RX ORDER — SENNOSIDES 8.6 MG
17.2 TABLET ORAL NIGHTLY
Status: DISCONTINUED | OUTPATIENT
Start: 2021-10-21 | End: 2021-10-22

## 2021-10-21 RX ORDER — DIAZEPAM 10 MG/1
10 TABLET ORAL EVERY 8 HOURS PRN
Status: DISCONTINUED | OUTPATIENT
Start: 2021-10-21 | End: 2021-10-22

## 2021-10-21 RX ORDER — DEXAMETHASONE SODIUM PHOSPHATE 4 MG/ML
VIAL (ML) INJECTION AS NEEDED
Status: DISCONTINUED | OUTPATIENT
Start: 2021-10-21 | End: 2021-10-21 | Stop reason: SURG

## 2021-10-21 RX ORDER — ROCURONIUM BROMIDE 10 MG/ML
INJECTION, SOLUTION INTRAVENOUS AS NEEDED
Status: DISCONTINUED | OUTPATIENT
Start: 2021-10-21 | End: 2021-10-21 | Stop reason: SURG

## 2021-10-21 RX ORDER — PROCHLORPERAZINE EDISYLATE 5 MG/ML
10 INJECTION INTRAMUSCULAR; INTRAVENOUS EVERY 6 HOURS PRN
Status: DISCONTINUED | OUTPATIENT
Start: 2021-10-21 | End: 2021-10-22

## 2021-10-21 RX ORDER — GENTAMICIN SULFATE 40 MG/ML
INJECTION, SOLUTION INTRAMUSCULAR; INTRAVENOUS AS NEEDED
Status: DISCONTINUED | OUTPATIENT
Start: 2021-10-21 | End: 2021-10-21 | Stop reason: HOSPADM

## 2021-10-21 RX ORDER — HYDROMORPHONE HYDROCHLORIDE 1 MG/ML
INJECTION, SOLUTION INTRAMUSCULAR; INTRAVENOUS; SUBCUTANEOUS
Status: COMPLETED
Start: 2021-10-21 | End: 2021-10-21

## 2021-10-21 RX ORDER — OXYCODONE HYDROCHLORIDE AND ACETAMINOPHEN 5; 325 MG/1; MG/1
2 TABLET ORAL EVERY 4 HOURS PRN
Status: DISCONTINUED | OUTPATIENT
Start: 2021-10-21 | End: 2021-10-22

## 2021-10-21 RX ORDER — DIPHENHYDRAMINE HYDROCHLORIDE 50 MG/ML
25 INJECTION INTRAMUSCULAR; INTRAVENOUS EVERY 4 HOURS PRN
Status: DISCONTINUED | OUTPATIENT
Start: 2021-10-21 | End: 2021-10-22

## 2021-10-21 RX ORDER — ACETAMINOPHEN 325 MG/1
650 TABLET ORAL EVERY 4 HOURS PRN
Status: DISCONTINUED | OUTPATIENT
Start: 2021-10-21 | End: 2021-10-22

## 2021-10-21 RX ORDER — HYDROCODONE BITARTRATE AND ACETAMINOPHEN 5; 325 MG/1; MG/1
1 TABLET ORAL AS NEEDED
Status: DISCONTINUED | OUTPATIENT
Start: 2021-10-21 | End: 2021-10-21 | Stop reason: HOSPADM

## 2021-10-21 RX ORDER — DIPHENHYDRAMINE HCL 25 MG
25 CAPSULE ORAL EVERY 4 HOURS PRN
Status: DISCONTINUED | OUTPATIENT
Start: 2021-10-21 | End: 2021-10-22

## 2021-10-21 RX ORDER — LIDOCAINE HYDROCHLORIDE 10 MG/ML
INJECTION, SOLUTION EPIDURAL; INFILTRATION; INTRACAUDAL; PERINEURAL AS NEEDED
Status: DISCONTINUED | OUTPATIENT
Start: 2021-10-21 | End: 2021-10-21 | Stop reason: SURG

## 2021-10-21 RX ORDER — KETOROLAC TROMETHAMINE 30 MG/ML
INJECTION, SOLUTION INTRAMUSCULAR; INTRAVENOUS
Status: COMPLETED
Start: 2021-10-21 | End: 2021-10-21

## 2021-10-21 RX ORDER — NEOSTIGMINE METHYLSULFATE 1 MG/ML
INJECTION INTRAVENOUS AS NEEDED
Status: DISCONTINUED | OUTPATIENT
Start: 2021-10-21 | End: 2021-10-21 | Stop reason: SURG

## 2021-10-21 RX ORDER — OXYCODONE HYDROCHLORIDE AND ACETAMINOPHEN 5; 325 MG/1; MG/1
1 TABLET ORAL EVERY 4 HOURS PRN
Status: DISCONTINUED | OUTPATIENT
Start: 2021-10-21 | End: 2021-10-22

## 2021-10-21 RX ORDER — HYDROMORPHONE HYDROCHLORIDE 1 MG/ML
0.2 INJECTION, SOLUTION INTRAMUSCULAR; INTRAVENOUS; SUBCUTANEOUS EVERY 2 HOUR PRN
Status: DISCONTINUED | OUTPATIENT
Start: 2021-10-21 | End: 2021-10-22

## 2021-10-21 RX ORDER — BISACODYL 10 MG
10 SUPPOSITORY, RECTAL RECTAL
Status: DISCONTINUED | OUTPATIENT
Start: 2021-10-21 | End: 2021-10-22

## 2021-10-21 RX ORDER — BUPIVACAINE HYDROCHLORIDE AND EPINEPHRINE 5; 5 MG/ML; UG/ML
INJECTION, SOLUTION EPIDURAL; INTRACAUDAL; PERINEURAL AS NEEDED
Status: DISCONTINUED | OUTPATIENT
Start: 2021-10-21 | End: 2021-10-21 | Stop reason: HOSPADM

## 2021-10-21 RX ORDER — DEXTROSE, SODIUM CHLORIDE, AND POTASSIUM CHLORIDE 5; .45; .15 G/100ML; G/100ML; G/100ML
INJECTION INTRAVENOUS CONTINUOUS
Status: DISCONTINUED | OUTPATIENT
Start: 2021-10-21 | End: 2021-10-22

## 2021-10-21 RX ORDER — DOCUSATE SODIUM 100 MG/1
100 CAPSULE, LIQUID FILLED ORAL 2 TIMES DAILY
Status: DISCONTINUED | OUTPATIENT
Start: 2021-10-21 | End: 2021-10-22

## 2021-10-21 RX ORDER — PHENYLEPHRINE HCL 10 MG/ML
VIAL (ML) INJECTION AS NEEDED
Status: DISCONTINUED | OUTPATIENT
Start: 2021-10-21 | End: 2021-10-21 | Stop reason: SURG

## 2021-10-21 RX ORDER — DEXAMETHASONE SODIUM PHOSPHATE 4 MG/ML
10 VIAL (ML) INJECTION EVERY 8 HOURS
Status: COMPLETED | OUTPATIENT
Start: 2021-10-21 | End: 2021-10-22

## 2021-10-21 RX ADMIN — PHENYLEPHRINE HCL 100 MCG: 10 MG/ML VIAL (ML) INJECTION at 11:08:00

## 2021-10-21 RX ADMIN — ROCURONIUM BROMIDE 40 MG: 10 INJECTION, SOLUTION INTRAVENOUS at 10:47:00

## 2021-10-21 RX ADMIN — GLYCOPYRROLATE 0.8 MG: 0.2 INJECTION, SOLUTION INTRAMUSCULAR; INTRAVENOUS at 12:18:00

## 2021-10-21 RX ADMIN — SODIUM CHLORIDE, SODIUM LACTATE, POTASSIUM CHLORIDE, CALCIUM CHLORIDE: 600; 310; 30; 20 INJECTION, SOLUTION INTRAVENOUS at 10:47:00

## 2021-10-21 RX ADMIN — DEXAMETHASONE SODIUM PHOSPHATE 8 MG: 4 MG/ML VIAL (ML) INJECTION at 10:47:00

## 2021-10-21 RX ADMIN — PHENYLEPHRINE HCL 100 MCG: 10 MG/ML VIAL (ML) INJECTION at 11:06:00

## 2021-10-21 RX ADMIN — SODIUM CHLORIDE, SODIUM LACTATE, POTASSIUM CHLORIDE, CALCIUM CHLORIDE: 600; 310; 30; 20 INJECTION, SOLUTION INTRAVENOUS at 12:14:00

## 2021-10-21 RX ADMIN — PHENYLEPHRINE HCL 100 MCG: 10 MG/ML VIAL (ML) INJECTION at 11:11:00

## 2021-10-21 RX ADMIN — SODIUM CHLORIDE, SODIUM LACTATE, POTASSIUM CHLORIDE, CALCIUM CHLORIDE: 600; 310; 30; 20 INJECTION, SOLUTION INTRAVENOUS at 12:21:00

## 2021-10-21 RX ADMIN — NEOSTIGMINE METHYLSULFATE 5 MG: 1 INJECTION INTRAVENOUS at 12:18:00

## 2021-10-21 RX ADMIN — METOCLOPRAMIDE HYDROCHLORIDE 10 MG: 5 INJECTION INTRAMUSCULAR; INTRAVENOUS at 10:47:00

## 2021-10-21 RX ADMIN — ONDANSETRON 4 MG: 2 INJECTION INTRAMUSCULAR; INTRAVENOUS at 10:47:00

## 2021-10-21 RX ADMIN — ROCURONIUM BROMIDE 10 MG: 10 INJECTION, SOLUTION INTRAVENOUS at 10:57:00

## 2021-10-21 RX ADMIN — SODIUM CHLORIDE, SODIUM LACTATE, POTASSIUM CHLORIDE, CALCIUM CHLORIDE: 600; 310; 30; 20 INJECTION, SOLUTION INTRAVENOUS at 11:13:00

## 2021-10-21 RX ADMIN — LIDOCAINE HYDROCHLORIDE 50 MG: 10 INJECTION, SOLUTION EPIDURAL; INFILTRATION; INTRACAUDAL; PERINEURAL at 10:47:00

## 2021-10-21 NOTE — OPERATIVE REPORT
[facility]  Operative Note    Artis Fulton , 46year old male Patient Status:  Outpatient in a Bed    10/7/1970 MRN [id number]   Attending Cisco Murdock MD PCP Dylan Araya MD   Surgeon Juju Maciel Date of Surgery 10/21/2021     [facility] the muscle out of the way and docked on the lamina of L3. We felt a 22 cm/millimeter in diameter to that is size 7 would be appropriate for this patient.   This was placed in the patient wound over the dilators and held in place with a retractor arm that w Dermabond and a coverlet dressing was applied. All sponge, instruments, and needle count were correct at the end of the case. Estimated blood loss was 20 cc. The patient was extubated and taken to recovery room in stable condition.  The patient was moving

## 2021-10-21 NOTE — CONSULTS
[facility]  Report of Consultation    Salvador Veraadam Patient Status:  Outpatient in a Bed    10/7/1970 MRN [id number]   [facility] 3SW-A Attending Naeem South MD   Hosp Day # 0 PCP Tremaine Carreno MD     Reason for Consultation:  Kavon Medal COMMENTS)    Comment:VISUAL CHANGES AND URINARY CHANGES    Medications:    Current Facility-Administered Medications:   •  lactated ringers infusion, , Intravenous, Continuous  •  sodium chloride 0.9% IV bolus 500 mL, 500 mL, Intravenous, Once PRN  •  Bam Sumner noted in the the HPI. Physical Exam:  Vital signs: Blood pressure 124/85, pulse 89, temperature 97.8 °F (36.6 °C), temperature source Oral, resp. rate 13, height 5' 10\" (1.778 m), weight 261 lb 0.4 oz (118.4 kg), SpO2 94 %. General: No acute distress.

## 2021-10-21 NOTE — ANESTHESIA POSTPROCEDURE EVALUATION
[address] Patient Status:  Outpatient in a Bed   Age/Gender 46year old male MRN [id number]   Location [address] [facility] Attending Brayan Blood MD   Hosp Day # 0 PCP Erica Canela MD       Anesthesia Post-op Note    left l

## 2021-10-21 NOTE — ANESTHESIA PROCEDURE NOTES
Airway  Date/Time: 10/21/2021 10:50 AM  Urgency: Elective    Airway not difficult    General Information and Staff    Patient location during procedure: OR  Anesthesiologist: Ermias Guerrero MD  Resident/CRNA: Harolyn Goodell, CRNA  Performed: CRNA     Indic

## 2021-10-21 NOTE — PROGRESS NOTES
NURSING ADMISSION NOTE      Patient admitted via Cart/bed from PACU post l3-4, L4-5 left hemilaminectomy by Dr. Loan Villatoro. Oriented to room. Received awake, alert and oriented x 4, patient was accompanied by his wife. Safety precautions initiated.   Pa

## 2021-10-21 NOTE — ANESTHESIA PREPROCEDURE EVALUATION
PRE-OP EVALUATION    Patient Name: Canelo Conway    Admit Diagnosis: Other spondylosis with radiculopathy, lumbar region [M47.26]    Pre-op Diagnosis: Other spondylosis with radiculopathy, lumbar region [M47.26]    lumbar three-four and lumbar four-five, 08/27/2021    K 3.9 08/27/2021     08/27/2021    CO2 23.0 08/27/2021    BUN 13 08/27/2021    CREATSERUM 1.12 08/27/2021    GLU 83 08/27/2021    CA 8.8 08/27/2021            Airway      Mallampati: II  Mouth opening: >3 FB  TM distance: 4 - 6 cm  Neck

## 2021-10-21 NOTE — H&P
[facility]  Neurological Surgery History and Physical    Abdi Sahni, 46year old male Patient Status:  Outpatient in a Bed    10/7/1970 MRN [id number]   Location [address] [place] [address] Attending Paramjit Moura MD in the upper lumbar spine.  New line disc narrowing L1-2, L3-4 through L5-S1.  Findings most severe at L3-4 and L4-5. No instability.     Very minimal range of motion in flexion and extension.    Facet arthropathy noted diffusely seen most severe in the l    No current facility-administered medications for this visit.        REVIEW OF SYSTEMS:  Review of Systems   Constitutional: Negative for chills and fever. Gastrointestinal: Negative for nausea and vomiting.    Genitourinary: Negative for frequency an PLAN:  1. The patient would like to pursue the left L3-4 and L4-5 hemilaminectomy medial facetectomy and foraminotomy.   The goal of surgery is to relieve his radicular pain; pain should improve within hours to days after surgery, weakness should improv

## 2021-10-22 VITALS
HEIGHT: 70 IN | DIASTOLIC BLOOD PRESSURE: 97 MMHG | RESPIRATION RATE: 19 BRPM | SYSTOLIC BLOOD PRESSURE: 149 MMHG | HEART RATE: 91 BPM | OXYGEN SATURATION: 88 % | WEIGHT: 261 LBS | BODY MASS INDEX: 37.37 KG/M2 | TEMPERATURE: 99 F

## 2021-10-22 PROCEDURE — 99224 SUBSEQUENT OBSERVATION CARE: CPT | Performed by: INTERNAL MEDICINE

## 2021-10-22 PROCEDURE — 99024 POSTOP FOLLOW-UP VISIT: CPT | Performed by: PHYSICIAN ASSISTANT

## 2021-10-22 RX ORDER — SIMETHICONE 80 MG
80 TABLET,CHEWABLE ORAL 4 TIMES DAILY PRN
Status: DISCONTINUED | OUTPATIENT
Start: 2021-10-22 | End: 2021-10-22

## 2021-10-22 RX ORDER — CYCLOBENZAPRINE HCL 10 MG
10 TABLET ORAL 3 TIMES DAILY PRN
Qty: 30 TABLET | Refills: 0 | Status: SHIPPED | OUTPATIENT
Start: 2021-10-22 | End: 2021-12-29 | Stop reason: ALTCHOICE

## 2021-10-22 RX ORDER — OXYCODONE HYDROCHLORIDE AND ACETAMINOPHEN 5; 325 MG/1; MG/1
1 TABLET ORAL EVERY 4 HOURS PRN
Qty: 30 TABLET | Refills: 0 | Status: SHIPPED | OUTPATIENT
Start: 2021-10-22 | End: 2021-12-29 | Stop reason: ALTCHOICE

## 2021-10-22 NOTE — PLAN OF CARE
Patient alert and oriented  , pain is well controlled with oral meds . Vital sign stable ,blood pressure slightly elevated ,denies any chest pain or short of breath . Dressing to back clean and dry . On flat bedrest c/o mild headache .  Voided per urinal ,t

## 2021-10-22 NOTE — PROGRESS NOTES
Patient up and out of bed to chair, denied having any headache. FRANK Golden updated on patient's condition. OK for discharge to home.

## 2021-10-22 NOTE — PROGRESS NOTES
Reviewed indications, side effects of pain medication/narcotics and constipation prevention.  Stressed importance of increased fluids/roughage in diet, continued use stool softeners along with laxatives and suppositories as needed while taking narcotics job

## 2021-10-22 NOTE — PLAN OF CARE
Received patient still on flat bedrest, denied having headache this morning. Start to elevated HOB at 15 degrees angle, and will continue to do so every one hour until able to sit up more. Lower back with coverlet dressing, looks clean, dry and intact.

## 2021-10-22 NOTE — PROGRESS NOTES
[facility]  Neurosurgery Progress Note    Natty Loredo Patient Status:  Outpatient in a Bed    10/7/1970 MRN [id number]   [facility] 3SW-A Attending Zee Doyle MD   Hosp Day # 0 PCP Shakira Denny MD     Subjective:  [address]

## 2021-10-22 NOTE — PROGRESS NOTES
[facility]  Progress Note    Eduar Maddoxort Patient Status:  Outpatient in a Bed    10/7/1970 MRN [id number]   [facility] 3SW-A Attending No att. providers found   Hosp Day # 0 PCP Peña Shrestha MD     CC: Medical management    SUBJE 0.9% IV bolus 500 mL, 500 mL, Intravenous, Once PRN  Senna (SENOKOT) tab 17.2 mg, 17.2 mg, Oral, Nightly  docusate sodium (COLACE) cap 100 mg, 100 mg, Oral, BID  PEG 3350 (MIRALAX) powder packet 17 g, 17 g, Oral, Daily PRN  magnesium hydroxide (MILK OF MAG needed        Quality:  · DVT Prophylaxis: SCD  · CODE status: full  · Santos: no      Will the patient be referred to [facility] on discharge?:  Follow-up with regular outpatient primary care physician and neurosurgeon  Estimated date of discharge: Plan on discha

## 2021-10-26 ENCOUNTER — TELEPHONE (OUTPATIENT)
Dept: SURGERY | Facility: CLINIC | Age: 51
End: 2021-10-26

## 2021-10-26 NOTE — TELEPHONE ENCOUNTER
Patient had LUMBAR LAMINECTOMY 2 LEVEL, lumbar three-four and lumbar four-five on 10/21/21 by Dr. Cindi June     For Lumbar Sx:     Post op day 5     1)Asked how --- is patient feeling in general she/he stated:   He is \"doing really well'.      2) Discussed D

## 2021-11-05 ENCOUNTER — OFFICE VISIT (OUTPATIENT)
Dept: SURGERY | Facility: CLINIC | Age: 51
End: 2021-11-05
Payer: COMMERCIAL

## 2021-11-05 VITALS
SYSTOLIC BLOOD PRESSURE: 120 MMHG | BODY MASS INDEX: 37.37 KG/M2 | WEIGHT: 261 LBS | DIASTOLIC BLOOD PRESSURE: 90 MMHG | HEIGHT: 70 IN

## 2021-11-05 DIAGNOSIS — M47.26 OTHER SPONDYLOSIS WITH RADICULOPATHY, LUMBAR REGION: ICD-10-CM

## 2021-11-05 DIAGNOSIS — M47.816 LUMBAR SPONDYLOSIS: Primary | ICD-10-CM

## 2021-11-05 PROCEDURE — 3008F BODY MASS INDEX DOCD: CPT | Performed by: PHYSICIAN ASSISTANT

## 2021-11-05 PROCEDURE — 3080F DIAST BP >= 90 MM HG: CPT | Performed by: PHYSICIAN ASSISTANT

## 2021-11-05 PROCEDURE — 3074F SYST BP LT 130 MM HG: CPT | Performed by: PHYSICIAN ASSISTANT

## 2021-11-05 PROCEDURE — 99024 POSTOP FOLLOW-UP VISIT: CPT | Performed by: PHYSICIAN ASSISTANT

## 2021-11-05 NOTE — PROGRESS NOTES
Neurosurgery Clinic Visit  2021    Ayan Sena PCP:  Lynda Peters MD    10/7/1970 MRN [id number]       CC:  S/P Left L3-4 hemilaminectomy, medial facetectomy, foraminotomy 10/21/21 Dr. Kinga Stoddard    HPI:    Frantz Torres is here for 2 week post op appt. hemilaminectomy  Lumbar Radiculopathy - Improved    PLAN:    Continue BLT restrictions for 6 weeks total.  Walk for exercise. Avoid submerging the incision. He asked about returning to work in a [address]. 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   He is not c

## 2021-11-05 NOTE — PROGRESS NOTES
Feeling ok  Still having some minor discomfort, from incision site, not really pain  Still has some numbness on left thigh, no pain  Leg is weak  He is up and moving around more and driving  Taking tylenol- gets enough pain relief  Not taking pain meds or

## 2021-12-03 DIAGNOSIS — I26.99 BILATERAL PULMONARY EMBOLISM (HCC): Primary | ICD-10-CM

## 2021-12-06 ENCOUNTER — OFFICE VISIT (OUTPATIENT)
Dept: SURGERY | Facility: CLINIC | Age: 51
End: 2021-12-06
Payer: COMMERCIAL

## 2021-12-06 VITALS
BODY MASS INDEX: 37.37 KG/M2 | SYSTOLIC BLOOD PRESSURE: 130 MMHG | WEIGHT: 261 LBS | HEIGHT: 70 IN | DIASTOLIC BLOOD PRESSURE: 90 MMHG | HEART RATE: 90 BPM

## 2021-12-06 DIAGNOSIS — Z98.890 S/P LUMBAR MICRODISCECTOMY: ICD-10-CM

## 2021-12-06 DIAGNOSIS — Z98.1 S/P LUMBAR FUSION: Primary | ICD-10-CM

## 2021-12-06 PROCEDURE — 99024 POSTOP FOLLOW-UP VISIT: CPT | Performed by: NEUROLOGICAL SURGERY

## 2021-12-06 PROCEDURE — 3008F BODY MASS INDEX DOCD: CPT | Performed by: NEUROLOGICAL SURGERY

## 2021-12-06 PROCEDURE — 3080F DIAST BP >= 90 MM HG: CPT | Performed by: NEUROLOGICAL SURGERY

## 2021-12-06 PROCEDURE — 3075F SYST BP GE 130 - 139MM HG: CPT | Performed by: NEUROLOGICAL SURGERY

## 2021-12-06 NOTE — PROGRESS NOTES
Pt is here regarding: post op 6 weeks   left lumbar three-four hemilaminectomy    Pain level at this moment:  1/10        Patient states he is doing a lot better, has some numbness/tiffling on right thigh.

## 2021-12-06 NOTE — PROGRESS NOTES
[facility]  Neurological Surgery Follow Up Clinic Note    Christine Uriosetgui 46year old, male    10/7/1970 MRN [id number]   PCP Naga Pickering MD Allergies   Gabapentin              OTHER (SEE COMMENTS)    Comment:VISUAL CHANGES AND URI weeks to months after surgery.     3. Mr. Ryann Murdock can follow-up with us on an as needed basis    Jatin Jones MD, [facility]  Board Certified, Neurological Surgeon  [facility], [address], [address]

## 2021-12-08 ENCOUNTER — SOCIAL WORK SERVICES (OUTPATIENT)
Dept: HEMATOLOGY/ONCOLOGY | Facility: HOSPITAL | Age: 51
End: 2021-12-08

## 2021-12-08 NOTE — PROGRESS NOTES
faxed requested documentation from 04/2020-present to [facility] at S#[phone]

## 2021-12-14 ENCOUNTER — TELEPHONE (OUTPATIENT)
Dept: SURGERY | Facility: CLINIC | Age: 51
End: 2021-12-14

## 2021-12-14 NOTE — TELEPHONE ENCOUNTER
Received request for medical records from [facility] from 4/1/2020 to present. Original sent to SCAN[employer]STAT copy sent to scanning.

## 2021-12-22 ENCOUNTER — TELEPHONE (OUTPATIENT)
Dept: FAMILY MEDICINE CLINIC | Facility: CLINIC | Age: 51
End: 2021-12-22

## 2021-12-22 NOTE — TELEPHONE ENCOUNTER
Recd request and authorization from [address], Disability Determination for all records from 4/1/2020 to present. Fax to [phone]. Faxed to [employer] for processing.

## 2021-12-29 ENCOUNTER — OFFICE VISIT (OUTPATIENT)
Dept: FAMILY MEDICINE CLINIC | Facility: CLINIC | Age: 51
End: 2021-12-29
Payer: COMMERCIAL

## 2021-12-29 VITALS
OXYGEN SATURATION: 99 % | DIASTOLIC BLOOD PRESSURE: 80 MMHG | WEIGHT: 265 LBS | SYSTOLIC BLOOD PRESSURE: 128 MMHG | BODY MASS INDEX: 37.94 KG/M2 | HEIGHT: 70 IN | RESPIRATION RATE: 18 BRPM | TEMPERATURE: 98 F | HEART RATE: 78 BPM

## 2021-12-29 DIAGNOSIS — I26.99 BILATERAL PULMONARY EMBOLISM (HCC): Primary | ICD-10-CM

## 2021-12-29 DIAGNOSIS — M54.50 LUMBAR PAIN: ICD-10-CM

## 2021-12-29 PROCEDURE — 99214 OFFICE O/P EST MOD 30 MIN: CPT | Performed by: FAMILY MEDICINE

## 2021-12-29 PROCEDURE — 3008F BODY MASS INDEX DOCD: CPT | Performed by: FAMILY MEDICINE

## 2021-12-29 PROCEDURE — 3079F DIAST BP 80-89 MM HG: CPT | Performed by: FAMILY MEDICINE

## 2021-12-29 PROCEDURE — 3074F SYST BP LT 130 MM HG: CPT | Performed by: FAMILY MEDICINE

## 2021-12-29 RX ORDER — DIPHENHYDRAMINE HCL 50 MG
1 CAPSULE ORAL NIGHTLY
COMMUNITY

## 2021-12-29 NOTE — PATIENT INSTRUCTIONS
Thank you for choosing Lynda Peters MD at [employer]  To Do: Ayan Sena  1. Please scheduled CT scan  [facility] is located in [address]. Monday, Tuesday & Friday – 8 a.m. to 4 p.m. Wednesday, Thursday – 7 a.m. to 3 p.m.   The l potential risks and we strive to make you healthier and to improve your quality of life.     Referrals, and Radiology Information:    If your insurance requires a referral to a specialist, please allow 5 business days to process your referral request.    If asks about your symptoms and health history.  You may also have one or more of the following:   · Blood tests to check for blood clotting or other problems  · Imaging tests to look for clots in the veins or lung  · Electrocardiography (ECG) to test how well elastic (compression) stockings and take breaks on long trips. Call 911  Call 911 or get emergency help if you have:   · Heavy or uncontrolled bleeding  · Symptoms of a blood clot that has traveled to the lungs. The symptoms include:  ? Chest pain  ?  Tro

## 2021-12-29 NOTE — PROGRESS NOTES
Subjective:   Patient ID: Art Artist is a 46year old male. HPI  Mr. Karel Bourgeois is a pleasant 15-year-old gentleman with history of bilateral pulmonary embolism diagnosed in April of this year. He is on Xarelto.   He also recently underwent lumbar surg acute distress. HENT:      Mouth/Throat:      Mouth: Mucous membranes are moist.      Pharynx: Oropharynx is clear. Eyes:      General: No scleral icterus.      Conjunctiva/sclera: Conjunctivae normal.   Cardiovascular:      Rate and Rhythm: Normal rate

## 2022-01-12 ENCOUNTER — TELEPHONE (OUTPATIENT)
Dept: FAMILY MEDICINE CLINIC | Facility: CLINIC | Age: 52
End: 2022-01-12

## 2022-01-12 NOTE — TELEPHONE ENCOUNTER
Message sent to referral department with [facility] information. Will let patient know when test is authorized.

## 2022-01-12 NOTE — TELEPHONE ENCOUNTER
- PT spoke with insurance and needs order for CT sent to [facility]. Ph.[phone]     Please call patient when order is placed.     Insurance Authorization number #[id number] Spoke with Phill Ryan [phone]    Please call

## 2022-01-13 NOTE — TELEPHONE ENCOUNTER
Insurance company is calling to give us the authorization number for the CT scan. Authorization: [id number], until 2/25/22. [address] [facility] . Radiology also authorized it as of two days ago per the caller.

## 2022-01-13 NOTE — TELEPHONE ENCOUNTER
Faxed order and authorization to [address] at [phone]. Fax confirmation received.

## 2022-01-26 ENCOUNTER — TELEPHONE (OUTPATIENT)
Dept: FAMILY MEDICINE CLINIC | Facility: CLINIC | Age: 52
End: 2022-01-26

## 2022-01-26 NOTE — TELEPHONE ENCOUNTER
I believe I had received it. I do not think there was presence of PE but I would ask them to send it back again just to make sure.   Thank you

## 2022-01-26 NOTE — TELEPHONE ENCOUNTER
[facility] phone [phone], would like results. They told the patient they sent the results last week, please advise.

## 2022-01-26 NOTE — TELEPHONE ENCOUNTER
Spoke with pt and provided Dr. Alisha Pichardo review of CT, normal, no evidence of PE, verbalized understanding. Pt then asking if he \"still has to take the Xarelto. \" Advised pt he will need to call Dr. Chiquis Cuenca office to follow up on length of treatment, pt v

## 2022-01-28 DIAGNOSIS — I26.99 BILATERAL PULMONARY EMBOLISM (HCC): ICD-10-CM

## 2022-01-28 RX ORDER — RIVAROXABAN 20 MG/1
TABLET, FILM COATED ORAL
Qty: 30 TABLET | Refills: 1 | Status: SHIPPED | OUTPATIENT
Start: 2022-01-28

## 2022-03-30 DIAGNOSIS — I26.99 BILATERAL PULMONARY EMBOLISM (HCC): ICD-10-CM

## 2022-03-30 RX ORDER — RIVAROXABAN 20 MG/1
TABLET, FILM COATED ORAL
Qty: 30 TABLET | Refills: 0 | Status: SHIPPED | OUTPATIENT
Start: 2022-03-30 | End: 2022-05-02

## 2022-05-01 DIAGNOSIS — I26.99 BILATERAL PULMONARY EMBOLISM (HCC): ICD-10-CM

## 2022-05-02 RX ORDER — RIVAROXABAN 20 MG/1
TABLET, FILM COATED ORAL
Qty: 30 TABLET | Refills: 0 | Status: SHIPPED | OUTPATIENT
Start: 2022-05-02 | End: 2022-06-10

## 2022-05-04 ENCOUNTER — TELEPHONE (OUTPATIENT)
Dept: FAMILY MEDICINE CLINIC | Facility: CLINIC | Age: 52
End: 2022-05-04

## 2022-05-04 NOTE — TELEPHONE ENCOUNTER
Patient's spouse states he needs a referral to f/up with Dr. Annemarie Salazar in hem/onc, please advise.

## 2022-06-10 ENCOUNTER — OFFICE VISIT (OUTPATIENT)
Dept: HEMATOLOGY/ONCOLOGY | Age: 52
End: 2022-06-10
Attending: INTERNAL MEDICINE
Payer: COMMERCIAL

## 2022-06-10 VITALS
TEMPERATURE: 99 F | SYSTOLIC BLOOD PRESSURE: 124 MMHG | DIASTOLIC BLOOD PRESSURE: 83 MMHG | BODY MASS INDEX: 37.41 KG/M2 | HEIGHT: 70 IN | OXYGEN SATURATION: 98 % | WEIGHT: 261.31 LBS | HEART RATE: 80 BPM

## 2022-06-10 DIAGNOSIS — D75.1 ERYTHROCYTOSIS: ICD-10-CM

## 2022-06-10 DIAGNOSIS — I26.99 BILATERAL PULMONARY EMBOLISM (HCC): Primary | ICD-10-CM

## 2022-06-10 LAB
ANION GAP SERPL CALC-SCNC: 6 MMOL/L (ref 0–18)
BASOPHILS # BLD AUTO: 0.08 X10(3) UL (ref 0–0.2)
BASOPHILS NFR BLD AUTO: 1 %
BUN BLD-MCNC: 15 MG/DL (ref 7–18)
CALCIUM BLD-MCNC: 9.2 MG/DL (ref 8.5–10.1)
CHLORIDE SERPL-SCNC: 104 MMOL/L (ref 98–112)
CO2 SERPL-SCNC: 25 MMOL/L (ref 21–32)
CREAT BLD-MCNC: 1 MG/DL
EOSINOPHIL # BLD AUTO: 0.15 X10(3) UL (ref 0–0.7)
EOSINOPHIL NFR BLD AUTO: 1.9 %
ERYTHROCYTE [DISTWIDTH] IN BLOOD BY AUTOMATED COUNT: 13.9 %
FASTING STATUS PATIENT QL REPORTED: NO
GLUCOSE BLD-MCNC: 87 MG/DL (ref 70–99)
HCT VFR BLD AUTO: 51.9 %
HGB BLD-MCNC: 17.9 G/DL
IMM GRANULOCYTES # BLD AUTO: 0.03 X10(3) UL (ref 0–1)
IMM GRANULOCYTES NFR BLD: 0.4 %
LYMPHOCYTES # BLD AUTO: 2.43 X10(3) UL (ref 1–4)
LYMPHOCYTES NFR BLD AUTO: 30.2 %
MCH RBC QN AUTO: 30.1 PG (ref 26–34)
MCHC RBC AUTO-ENTMCNC: 34.5 G/DL (ref 31–37)
MCV RBC AUTO: 87.4 FL
MONOCYTES # BLD AUTO: 0.71 X10(3) UL (ref 0.1–1)
MONOCYTES NFR BLD AUTO: 8.8 %
NEUTROPHILS # BLD AUTO: 4.64 X10 (3) UL (ref 1.5–7.7)
NEUTROPHILS # BLD AUTO: 4.64 X10(3) UL (ref 1.5–7.7)
NEUTROPHILS NFR BLD AUTO: 57.7 %
OSMOLALITY SERPL CALC.SUM OF ELEC: 280 MOSM/KG (ref 275–295)
PLATELET # BLD AUTO: 151 10(3)UL (ref 150–450)
POTASSIUM SERPL-SCNC: 4 MMOL/L (ref 3.5–5.1)
RBC # BLD AUTO: 5.94 X10(6)UL
SODIUM SERPL-SCNC: 135 MMOL/L (ref 136–145)
WBC # BLD AUTO: 8 X10(3) UL (ref 4–11)

## 2022-06-10 PROCEDURE — 99214 OFFICE O/P EST MOD 30 MIN: CPT | Performed by: INTERNAL MEDICINE

## 2022-06-10 NOTE — PROGRESS NOTES
Patient is here for follow up for PE taking xarelto. He has not taken this for about 10 days because he ran out and needed a follow up appointment. He denies any shortness of breath or cough. He denies any swelling of his legs or pain in the calves. He has some gout in his right great toe and has changed his diet to help this. Patient's appetite and energy are good.     Education Record    Learner:  Patient    Disease / Diagnosis: bilateral PE    Barriers / Limitations:  None   Comments:    Method:  Discussion   Comments:    General Topics:  Side effects and symptom management   Comments:    Outcome:  Shows understanding   Comments:

## 2022-09-28 ENCOUNTER — OFFICE VISIT (OUTPATIENT)
Dept: FAMILY MEDICINE CLINIC | Facility: CLINIC | Age: 52
End: 2022-09-28

## 2022-09-28 VITALS
SYSTOLIC BLOOD PRESSURE: 120 MMHG | OXYGEN SATURATION: 97 % | TEMPERATURE: 98 F | BODY MASS INDEX: 36.65 KG/M2 | WEIGHT: 256 LBS | HEIGHT: 70 IN | RESPIRATION RATE: 16 BRPM | HEART RATE: 76 BPM | DIASTOLIC BLOOD PRESSURE: 72 MMHG

## 2022-09-28 DIAGNOSIS — Z13.228 SCREENING FOR ENDOCRINE, METABOLIC AND IMMUNITY DISORDER: ICD-10-CM

## 2022-09-28 DIAGNOSIS — M54.16 LUMBAR BACK PAIN WITH RADICULOPATHY AFFECTING LEFT LOWER EXTREMITY: ICD-10-CM

## 2022-09-28 DIAGNOSIS — E66.9 OBESITY (BMI 30-39.9): ICD-10-CM

## 2022-09-28 DIAGNOSIS — D75.1 ERYTHROCYTOSIS: ICD-10-CM

## 2022-09-28 DIAGNOSIS — Z13.0 SCREENING FOR ENDOCRINE, METABOLIC AND IMMUNITY DISORDER: ICD-10-CM

## 2022-09-28 DIAGNOSIS — Z13.29 SCREENING FOR ENDOCRINE, METABOLIC AND IMMUNITY DISORDER: ICD-10-CM

## 2022-09-28 DIAGNOSIS — Z12.5 SCREENING FOR MALIGNANT NEOPLASM OF PROSTATE: Primary | ICD-10-CM

## 2022-09-28 PROCEDURE — 3078F DIAST BP <80 MM HG: CPT | Performed by: FAMILY MEDICINE

## 2022-09-28 PROCEDURE — 90471 IMMUNIZATION ADMIN: CPT | Performed by: FAMILY MEDICINE

## 2022-09-28 PROCEDURE — 99214 OFFICE O/P EST MOD 30 MIN: CPT | Performed by: FAMILY MEDICINE

## 2022-09-28 PROCEDURE — 3074F SYST BP LT 130 MM HG: CPT | Performed by: FAMILY MEDICINE

## 2022-09-28 PROCEDURE — 3008F BODY MASS INDEX DOCD: CPT | Performed by: FAMILY MEDICINE

## 2022-09-28 PROCEDURE — 90686 IIV4 VACC NO PRSV 0.5 ML IM: CPT | Performed by: FAMILY MEDICINE

## 2022-09-28 PROCEDURE — 3075F SYST BP GE 130 - 139MM HG: CPT | Performed by: FAMILY MEDICINE

## 2022-09-28 PROCEDURE — 3079F DIAST BP 80-89 MM HG: CPT | Performed by: FAMILY MEDICINE

## 2022-09-30 ENCOUNTER — LAB ENCOUNTER (OUTPATIENT)
Dept: LAB | Age: 52
End: 2022-09-30
Attending: FAMILY MEDICINE
Payer: COMMERCIAL

## 2022-09-30 DIAGNOSIS — Z13.29 SCREENING FOR ENDOCRINE, METABOLIC AND IMMUNITY DISORDER: ICD-10-CM

## 2022-09-30 DIAGNOSIS — Z13.0 SCREENING FOR ENDOCRINE, METABOLIC AND IMMUNITY DISORDER: ICD-10-CM

## 2022-09-30 DIAGNOSIS — Z12.5 SCREENING FOR MALIGNANT NEOPLASM OF PROSTATE: ICD-10-CM

## 2022-09-30 DIAGNOSIS — Z13.228 SCREENING FOR ENDOCRINE, METABOLIC AND IMMUNITY DISORDER: ICD-10-CM

## 2022-09-30 DIAGNOSIS — D75.1 ERYTHROCYTOSIS: ICD-10-CM

## 2022-09-30 LAB
ALBUMIN SERPL-MCNC: 3.9 G/DL (ref 3.4–5)
ALBUMIN/GLOB SERPL: 1.1 {RATIO} (ref 1–2)
ALP LIVER SERPL-CCNC: 58 U/L
ALT SERPL-CCNC: 43 U/L
ANION GAP SERPL CALC-SCNC: 11 MMOL/L (ref 0–18)
AST SERPL-CCNC: 29 U/L (ref 15–37)
BASOPHILS # BLD AUTO: 0.07 X10(3) UL (ref 0–0.2)
BASOPHILS NFR BLD AUTO: 1.1 %
BILIRUB SERPL-MCNC: 0.8 MG/DL (ref 0.1–2)
BUN BLD-MCNC: 11 MG/DL (ref 7–18)
CALCIUM BLD-MCNC: 8.9 MG/DL (ref 8.5–10.1)
CHLORIDE SERPL-SCNC: 108 MMOL/L (ref 98–112)
CHOLEST SERPL-MCNC: 155 MG/DL (ref ?–200)
CO2 SERPL-SCNC: 22 MMOL/L (ref 21–32)
COMPLEXED PSA SERPL-MCNC: 1.08 NG/ML (ref ?–4)
CREAT BLD-MCNC: 1.09 MG/DL
EOSINOPHIL # BLD AUTO: 0.14 X10(3) UL (ref 0–0.7)
EOSINOPHIL NFR BLD AUTO: 2.1 %
ERYTHROCYTE [DISTWIDTH] IN BLOOD BY AUTOMATED COUNT: 13.6 %
FASTING PATIENT LIPID ANSWER: YES
FASTING STATUS PATIENT QL REPORTED: YES
GFR SERPLBLD BASED ON 1.73 SQ M-ARVRAT: 81 ML/MIN/1.73M2 (ref 60–?)
GLOBULIN PLAS-MCNC: 3.7 G/DL (ref 2.8–4.4)
GLUCOSE BLD-MCNC: 95 MG/DL (ref 70–99)
HCT VFR BLD AUTO: 54.7 %
HDLC SERPL-MCNC: 41 MG/DL (ref 40–59)
HGB BLD-MCNC: 18.5 G/DL
IMM GRANULOCYTES # BLD AUTO: 0.03 X10(3) UL (ref 0–1)
IMM GRANULOCYTES NFR BLD: 0.5 %
LDLC SERPL CALC-MCNC: 92 MG/DL (ref ?–100)
LYMPHOCYTES # BLD AUTO: 2.29 X10(3) UL (ref 1–4)
LYMPHOCYTES NFR BLD AUTO: 35 %
MCH RBC QN AUTO: 30.5 PG (ref 26–34)
MCHC RBC AUTO-ENTMCNC: 33.8 G/DL (ref 31–37)
MCV RBC AUTO: 90.3 FL
MONOCYTES # BLD AUTO: 0.54 X10(3) UL (ref 0.1–1)
MONOCYTES NFR BLD AUTO: 8.3 %
NEUTROPHILS # BLD AUTO: 3.47 X10 (3) UL (ref 1.5–7.7)
NEUTROPHILS # BLD AUTO: 3.47 X10(3) UL (ref 1.5–7.7)
NEUTROPHILS NFR BLD AUTO: 53 %
NONHDLC SERPL-MCNC: 114 MG/DL (ref ?–130)
OSMOLALITY SERPL CALC.SUM OF ELEC: 291 MOSM/KG (ref 275–295)
PLATELET # BLD AUTO: 145 10(3)UL (ref 150–450)
POTASSIUM SERPL-SCNC: 4.2 MMOL/L (ref 3.5–5.1)
PROT SERPL-MCNC: 7.6 G/DL (ref 6.4–8.2)
RBC # BLD AUTO: 6.06 X10(6)UL
SODIUM SERPL-SCNC: 141 MMOL/L (ref 136–145)
TRIGL SERPL-MCNC: 123 MG/DL (ref 30–149)
VLDLC SERPL CALC-MCNC: 20 MG/DL (ref 0–30)
WBC # BLD AUTO: 6.5 X10(3) UL (ref 4–11)

## 2022-09-30 PROCEDURE — 85025 COMPLETE CBC W/AUTO DIFF WBC: CPT

## 2022-09-30 PROCEDURE — 36415 COLL VENOUS BLD VENIPUNCTURE: CPT

## 2022-09-30 PROCEDURE — 80061 LIPID PANEL: CPT

## 2022-09-30 PROCEDURE — 80053 COMPREHEN METABOLIC PANEL: CPT

## 2022-10-04 ENCOUNTER — TELEPHONE (OUTPATIENT)
Dept: FAMILY MEDICINE CLINIC | Facility: CLINIC | Age: 52
End: 2022-10-04

## 2022-10-07 ENCOUNTER — TELEPHONE (OUTPATIENT)
Dept: FAMILY MEDICINE CLINIC | Facility: CLINIC | Age: 52
End: 2022-10-07

## 2022-10-07 DIAGNOSIS — D58.2 ELEVATED HEMOGLOBIN (HCC): Primary | ICD-10-CM

## 2022-10-07 NOTE — TELEPHONE ENCOUNTER
Patient states he received a message referring him to hematology. He has seen Dr Aneta Sosa several times, is that who is supposed to make an appt with? Or a new/different doctor? Please advise.

## 2022-10-10 ENCOUNTER — OFFICE VISIT (OUTPATIENT)
Dept: HEMATOLOGY/ONCOLOGY | Facility: HOSPITAL | Age: 52
End: 2022-10-10
Attending: INTERNAL MEDICINE
Payer: COMMERCIAL

## 2022-10-10 VITALS
DIASTOLIC BLOOD PRESSURE: 90 MMHG | OXYGEN SATURATION: 98 % | HEART RATE: 83 BPM | TEMPERATURE: 99 F | WEIGHT: 260 LBS | BODY MASS INDEX: 37 KG/M2 | SYSTOLIC BLOOD PRESSURE: 138 MMHG | RESPIRATION RATE: 18 BRPM

## 2022-10-10 DIAGNOSIS — D69.6 THROMBOCYTOPENIA (HCC): ICD-10-CM

## 2022-10-10 DIAGNOSIS — I26.99 BILATERAL PULMONARY EMBOLISM (HCC): Primary | ICD-10-CM

## 2022-10-10 DIAGNOSIS — D75.1 ERYTHROCYTOSIS: ICD-10-CM

## 2022-10-10 PROCEDURE — 99214 OFFICE O/P EST MOD 30 MIN: CPT | Performed by: INTERNAL MEDICINE

## 2022-10-10 NOTE — PROGRESS NOTES
Patient had surgery on his spine last October. He was having pain in his legs prior to surgery. He had recent blood work that show a Hgb up to 18.5 and hematocrit 54.7. His primary doctor referred him back to hematology for this. He has been taking xarelto without any bleeding symptoms. He denies any cough, shortness of breath, fevers, night sweats or chest pain. He has again had some pain in his legs. He denies any swelling in his legs.       Education Record    Learner:  Patient    Disease / Diagnosis: abnormal labs    Barriers / Limitations:  None   Comments:    Method:  Discussion   Comments:    General Topics:  Side effects and symptom management   Comments:    Outcome:  Shows understanding   Comments:

## 2022-10-11 ENCOUNTER — TELEPHONE (OUTPATIENT)
Dept: FAMILY MEDICINE CLINIC | Facility: CLINIC | Age: 52
End: 2022-10-11

## 2022-10-11 NOTE — TELEPHONE ENCOUNTER
- PT is needing a new sleep study order as it is going to  before patient can have sleep study done.     Please call patient when order is updated Ph. [phone]

## 2022-10-21 ENCOUNTER — TELEPHONE (OUTPATIENT)
Dept: FAMILY MEDICINE CLINIC | Facility: CLINIC | Age: 52
End: 2022-10-21

## 2022-10-21 DIAGNOSIS — R06.83 SNORING: Primary | ICD-10-CM

## 2022-10-21 DIAGNOSIS — R06.81 APNEA: ICD-10-CM

## 2022-10-21 NOTE — TELEPHONE ENCOUNTER
Calling to have new order placed for the sleep study. Needs information for the sleep study and office visit notes.

## 2022-11-09 ENCOUNTER — OFFICE VISIT (OUTPATIENT)
Dept: SLEEP CENTER | Age: 52
End: 2022-11-09
Attending: INTERNAL MEDICINE
Payer: COMMERCIAL

## 2022-11-09 DIAGNOSIS — R06.81 APNEA: ICD-10-CM

## 2022-11-09 DIAGNOSIS — R06.83 SNORING: ICD-10-CM

## 2022-11-09 PROCEDURE — 95806 SLEEP STUDY UNATT&RESP EFFT: CPT

## 2022-11-11 NOTE — PROCEDURES
[address]       Accredited by the [employer] of Sleep Medicine (AASM)    PATIENT'S NAME:        Criss Pryor  ATTENDING PHYSICIAN:   Jus Ritter M.D. REFERRING PHYSICIAN:   Ana Maria Allen. Hiral Petersen MD  PATIENT ACCOUNT #:     [de-identified]        LOCATION:       [facility] RECORD #:      [id number]        YOB: 1970  DATE OF STUDY:         11/09/2022    SLEEP STUDY REPORT    STUDY TYPE:  Unattended sleep study. DEMOGRAPHICS:  Height 5 feet 11 inches. Weight 260 pounds. BMI 36. ICD-10 G47.33. CLINICAL HISTORY:  The patient is a 57-year-old male who reports snoring and daytime fatigue. UNATTENDED SLEEP STUDY RECORDING PARAMETERS:  The patient underwent a formal technically adequate unattended diagnostic sleep study coordinated with the [facility]. The study was performed in accordance with the AASM standard for Out of Center Sleep Testing. The four-channel Type III HST measures the following parameters:  flow, respiratory effort, pulse, and oxygen saturation. SCORING:  This study was scored in accordance with AASM scoring rules and Medicare rule 1B. RECORDING DETAILS:  A total of 9 hours and 2 minutes was recorded using an ApneaLink Air Type III home sleep test recording device. STATISTICS:  The apnea-hypopnea index was 13.9. Oxyhemoglobin saturation brennen was 80%. IMPRESSION:  This study demonstrates obstructive sleep apnea. RECOMMENDATIONS:  This patient should follow up in the sleep clinic for consultation and further recommendations. My office will contact the patient to schedule this consult. A trial of nasal CPAP can be considered, at which time the patient may need to return for polysomnography to perform CPAP titration.   This patient should avoid use of alcohol and sedating medications at bedtime, and engage in a formal weight loss program.  This patient should avoid driving while sleepy at all times.    Dictated By Lindsay Norris M.D.  d: 11/10/2022 17:32:57  t: 11/10/2022 18:19:37  Job [id number]  ALIDA/    cc: Krystin Baldwin.  Lizzette Morin MD

## 2022-11-29 ENCOUNTER — TELEPHONE (OUTPATIENT)
Dept: FAMILY MEDICINE CLINIC | Facility: CLINIC | Age: 52
End: 2022-11-29

## 2022-11-29 DIAGNOSIS — G47.33 OSA (OBSTRUCTIVE SLEEP APNEA): Primary | ICD-10-CM

## 2022-11-29 NOTE — TELEPHONE ENCOUNTER
LM informing pt of sleep study results and advised that sleep medicine/pulm office should be calling pt to schedule appointment. Advised pt if he has not heard from them to give them a call and provided phone number for [facility]. Asked pt to call back with any questions.

## 2022-11-29 NOTE — TELEPHONE ENCOUNTER
Patient saw the sleep study results, would like to know what the next step is or if he needs an appt, please advise.

## 2022-12-06 ENCOUNTER — OFFICE VISIT (OUTPATIENT)
Dept: FAMILY MEDICINE CLINIC | Facility: CLINIC | Age: 52
End: 2022-12-06
Payer: COMMERCIAL

## 2022-12-06 VITALS
TEMPERATURE: 97 F | RESPIRATION RATE: 16 BRPM | SYSTOLIC BLOOD PRESSURE: 130 MMHG | OXYGEN SATURATION: 98 % | WEIGHT: 255 LBS | HEART RATE: 78 BPM | HEIGHT: 70 IN | DIASTOLIC BLOOD PRESSURE: 88 MMHG | BODY MASS INDEX: 36.51 KG/M2

## 2022-12-06 DIAGNOSIS — J01.00 ACUTE NON-RECURRENT MAXILLARY SINUSITIS: Primary | ICD-10-CM

## 2022-12-06 RX ORDER — AMOXICILLIN AND CLAVULANATE POTASSIUM 875; 125 MG/1; MG/1
1 TABLET, FILM COATED ORAL 2 TIMES DAILY
Qty: 20 TABLET | Refills: 0 | Status: SHIPPED | OUTPATIENT
Start: 2022-12-06 | End: 2022-12-16

## 2023-02-23 ENCOUNTER — TELEPHONE (OUTPATIENT)
Dept: FAMILY MEDICINE CLINIC | Facility: CLINIC | Age: 53
End: 2023-02-23

## 2023-02-23 DIAGNOSIS — G47.33 OSA (OBSTRUCTIVE SLEEP APNEA): Primary | ICD-10-CM

## 2023-02-23 NOTE — TELEPHONE ENCOUNTER
See referral message- referral is unable to be backdated per patient's insurance.  Message sent to patient

## 2023-02-23 NOTE — TELEPHONE ENCOUNTER
Pt's wife Malik Evangelista) called and said Juana Rapp had an appt. With Dr. Shravan Flor on 12/9/22 but there was no referral for that visit. Sarah Hernandez called their insurance because she got a bill so she wanted to find out why and was told there was no referral for that DOS. The insurance [employer]) told her the doctor can write a referral and backdate it for that date.

## 2023-02-28 NOTE — PROGRESS NOTES
Daily Note     Today's date: 2023  Patient name: Marcos Venegas  : 1983  MRN: [id number]  Referring provider: Maldonado Taylor MD  Dx:   Encounter Diagnosis     ICD-10-CM    1  Right shoulder pain, unspecified chronicity  M25 511       2  Rotator cuff impingement syndrome, right  M75 41           Start Time: 1600  Stop Time: 1643  Total time in clinic (min): 43 minutes      Subjective: "I'm feeling pretty good with no pain " Patient states her gym lifts/activities have been going well  Objective: See treatment diary below      Assessment: Tolerated treatment well with continued focus on mobility, scapular/rotator cuff strengthening/neuromuscular control  Required verbal/tactile cues throughout for T's/Y's/W's  Patient demonstrated fatigue post treatment and would benefit from continued PT with discharge likely next visit  Plan: Continue per plan of care  Discharge likely next visit barring major setback  Precautions: HTN, anxiety  Access Code: [email or web address]  URL: [email or web address]  Date: 2023  Prepared by: Pily Roof    Exercises  • Seated Scapular Retraction - 2-3 x daily - 7 x weekly - 10 reps - 5-10 second hold  • Single Arm Doorway Pec Stretch at 90 Degrees Abduction - 2 x daily - 7 x weekly - 10 reps - 10 seconds hold      Manuals IE 2/9/23 2/15/23 2/17/23 2/21/23 2/23/23 2/28/23    Shoulder PROM  GJL GJL subscap/ teres, GJL subscap/ teres, GJL subscap/ teres,     Shoulder Mobs          Soft Tissue Deformation  GJL subscap/ teres,    Along radial nerve pathway GJL subscap/ teres, medial shoulder blade with active IR GJL subscap/ teres, medial shoulder blade with active IR GJL subscap/ teres, medial shoulder blade with active IR     T/spine  GJL assessment/mob Gr 3-4 GJL Gr 3-4 GJL Gr 3-4      Neuro Re-Ed          Scap Squeeze 2x5x5-10" holds Rev        Rows  Peach Tube 8x3" holds,  Orange Tube 2x8x3" Green Tube 3x6x3" holds         I's  Peach Tube 3x8x3" [facility] Note: Antimicrobial Weight Based Dose Adjustment for: cefazolin (ANCEF)    Anahi Madsen is a 46year old patient who has been prescribed cefazolin (ANCEF) 1000 mg every 8 hours x 3 doses  CrCl cannot be calculated (Patient's most recent lab holds Orange Tube 3x6x3" holds Orange Tube 2x8 Orange Tube 3x8 Peach Tube 3x10    T's    Orange Tube 2x6 cues for form Peach Tube 3x8x3" hold Peach Tube 3x8    Y's      Peach Tube 2x6    W's      [place] Tube 3x6    ER   Orange Tube 2x10 Green Tube 2x8 Green Tube 3x8     IR   Orange Tube 2x10 Green Tube 2x8 Green Tube 3x8     Towel slide LT lift    2x6 3x8 3x10                        Ther Ex          UBE  3'/3' 3'/3' 3'/3' 4'/4' 4'/4'    Wall Slides           Pec Stretch  Rev        OH Press      Rev    IR stretch          Cross body stretch          Thoracic Ext over chair  5x5" - HEP Rev Rev      KB carries      5# KB, 90/90 hold flexion/ scaption, 2 laps turf ea    Ther Activity          Lifting mechanics reviewed    Shoulder press, triceps overhead, bicep curls, tricep kickback, tricep pull downs                Gait Training                              Modalities

## 2023-04-03 ENCOUNTER — OFFICE VISIT (OUTPATIENT)
Dept: FAMILY MEDICINE CLINIC | Facility: CLINIC | Age: 53
End: 2023-04-03
Payer: COMMERCIAL

## 2023-04-03 VITALS
WEIGHT: 258 LBS | HEART RATE: 90 BPM | HEIGHT: 70 IN | SYSTOLIC BLOOD PRESSURE: 126 MMHG | DIASTOLIC BLOOD PRESSURE: 80 MMHG | BODY MASS INDEX: 36.94 KG/M2 | TEMPERATURE: 98 F | RESPIRATION RATE: 16 BRPM | OXYGEN SATURATION: 99 %

## 2023-04-03 DIAGNOSIS — Z80.42 FAMILY HISTORY OF PROSTATE CANCER: ICD-10-CM

## 2023-04-03 DIAGNOSIS — Z00.00 WELLNESS EXAMINATION: Primary | ICD-10-CM

## 2023-04-03 PROCEDURE — 3074F SYST BP LT 130 MM HG: CPT | Performed by: FAMILY MEDICINE

## 2023-04-03 PROCEDURE — 3079F DIAST BP 80-89 MM HG: CPT | Performed by: FAMILY MEDICINE

## 2023-04-03 PROCEDURE — 3008F BODY MASS INDEX DOCD: CPT | Performed by: FAMILY MEDICINE

## 2023-04-03 PROCEDURE — 99396 PREV VISIT EST AGE 40-64: CPT | Performed by: FAMILY MEDICINE

## 2023-04-03 NOTE — PATIENT INSTRUCTIONS
Thank you for choosing Tra Cameron MD at [place] [address]  To Do: [place]  1. Please see age appropriate health prevention below    [employer] [employer] [facility] is located in [address]. Monday, Tuesday & Friday - 8 a.m. to 4 p.m. Wednesday, Thursday - 7 a.m. to 3 p.m. The lab is closed daily from 12 p.m.-12:30 p.m. Saturday lab hours by appointment. Call [phone] to schedule the appointment. Please signup for [employer], which is electronic access to your record if you have not done so. All your results will post on there. [email or web address]   You can NOW use [employer]Points to book your appointments with us, or consider using open access scheduling which is through the edward website [email or web address] and type in Tra Cameron MD and follow the links for \"Schedule Online Now\"    To schedule Imaging or tests at [facility] Scheduling [phone], Go to [facility] A ER Building (For example: CT scans, X rays, Ultrasound, MRI)  Cardiac Testing in ER building Building A second floor Cardiac Testing [phone] (For example: Holter Monitor, Cardiac Stress tests,Event Monitor, or 2D Echocardiograms)  [facility] call [phone] usually in [facility] A  Walk in Clinic in [place] at [facility]. [address] Mon-Fri at 8am-7:30 p.m., and Sat/Sun 9:00a. m.-4:30 p.m. Also at [address]  Call [phone] for info     Please call our office about any questions regarding your treatment/medicines/tests as a result of today's visit. For your safety, read the entire package insert of all medicines prescribed to you and be aware of all of the risks of treatment even beyond those discussed today. All therapies have potential risk of harm or side effects or medication interactions.   It is your duty and for your safety to discuss with the pharmacist and our office with questions, and to notify us and stop treatment if problems arise, but know that our intention is that the benefits outweigh those potential risks and we strive to make you healthier and to improve your quality of life. Referrals, and Radiology Information:    If your insurance requires a referral to a specialist, please allow 5 business days to process your referral request.    If Gavi Gutierrez MD orders a CT or MRI, it may take up to 10 business days to receive approval from your insurance company. Once our office has called informing you that the insurance company approved your testing, please call [facility] Scheduling at [phone]  Please allow our office 5 business days to contact you regarding any testing results. Refill policies:   Allow 3 business days for refills; controlled substances may take longer and must be picked up from the office in person. Narcotic medications can only be filled in 30 day increments and must be refilled at an office visit only. If your prescription is due for a refill, you may be due for a follow-up appointment. We cannot refill your maintenance medications at a preventative wellness visit. To best provide you care, patients receiving maintenance medications need to be seen at least twice a year.

## 2023-04-15 ENCOUNTER — LAB ENCOUNTER (OUTPATIENT)
Dept: LAB | Age: 53
End: 2023-04-15
Attending: FAMILY MEDICINE
Payer: COMMERCIAL

## 2023-04-15 DIAGNOSIS — Z00.00 WELLNESS EXAMINATION: ICD-10-CM

## 2023-04-15 DIAGNOSIS — Z80.42 FAMILY HISTORY OF PROSTATE CANCER: ICD-10-CM

## 2023-04-15 LAB
ALBUMIN SERPL-MCNC: 3.9 G/DL (ref 3.4–5)
ALBUMIN/GLOB SERPL: 1.1 {RATIO} (ref 1–2)
ALP LIVER SERPL-CCNC: 61 U/L
ALT SERPL-CCNC: 37 U/L
ANION GAP SERPL CALC-SCNC: 4 MMOL/L (ref 0–18)
AST SERPL-CCNC: 28 U/L (ref 15–37)
BASOPHILS # BLD AUTO: 0.1 X10(3) UL (ref 0–0.2)
BASOPHILS NFR BLD AUTO: 1.7 %
BILIRUB SERPL-MCNC: 0.6 MG/DL (ref 0.1–2)
BUN BLD-MCNC: 16 MG/DL (ref 7–18)
CALCIUM BLD-MCNC: 8.9 MG/DL (ref 8.5–10.1)
CHLORIDE SERPL-SCNC: 110 MMOL/L (ref 98–112)
CHOLEST SERPL-MCNC: 165 MG/DL (ref ?–200)
CO2 SERPL-SCNC: 24 MMOL/L (ref 21–32)
COMPLEXED PSA SERPL-MCNC: 1.04 NG/ML (ref ?–4)
CREAT BLD-MCNC: 1 MG/DL
EOSINOPHIL # BLD AUTO: 0.17 X10(3) UL (ref 0–0.7)
EOSINOPHIL NFR BLD AUTO: 3 %
ERYTHROCYTE [DISTWIDTH] IN BLOOD BY AUTOMATED COUNT: 14.3 %
FASTING PATIENT LIPID ANSWER: YES
FASTING STATUS PATIENT QL REPORTED: YES
GFR SERPLBLD BASED ON 1.73 SQ M-ARVRAT: 89 ML/MIN/1.73M2 (ref 60–?)
GLOBULIN PLAS-MCNC: 3.7 G/DL (ref 2.8–4.4)
GLUCOSE BLD-MCNC: 98 MG/DL (ref 70–99)
HCT VFR BLD AUTO: 52.9 %
HDLC SERPL-MCNC: 42 MG/DL (ref 40–59)
HGB BLD-MCNC: 18.3 G/DL
IMM GRANULOCYTES # BLD AUTO: 0.04 X10(3) UL (ref 0–1)
IMM GRANULOCYTES NFR BLD: 0.7 %
LDLC SERPL CALC-MCNC: 101 MG/DL (ref ?–100)
LYMPHOCYTES # BLD AUTO: 2.22 X10(3) UL (ref 1–4)
LYMPHOCYTES NFR BLD AUTO: 38.5 %
MCH RBC QN AUTO: 30.3 PG (ref 26–34)
MCHC RBC AUTO-ENTMCNC: 34.6 G/DL (ref 31–37)
MCV RBC AUTO: 87.6 FL
MONOCYTES # BLD AUTO: 0.48 X10(3) UL (ref 0.1–1)
MONOCYTES NFR BLD AUTO: 8.3 %
NEUTROPHILS # BLD AUTO: 2.75 X10 (3) UL (ref 1.5–7.7)
NEUTROPHILS # BLD AUTO: 2.75 X10(3) UL (ref 1.5–7.7)
NEUTROPHILS NFR BLD AUTO: 47.8 %
NONHDLC SERPL-MCNC: 123 MG/DL (ref ?–130)
OSMOLALITY SERPL CALC.SUM OF ELEC: 287 MOSM/KG (ref 275–295)
PLATELET # BLD AUTO: 132 10(3)UL (ref 150–450)
POTASSIUM SERPL-SCNC: 4.2 MMOL/L (ref 3.5–5.1)
PROT SERPL-MCNC: 7.6 G/DL (ref 6.4–8.2)
RBC # BLD AUTO: 6.04 X10(6)UL
SODIUM SERPL-SCNC: 138 MMOL/L (ref 136–145)
T4 FREE SERPL-MCNC: 1 NG/DL (ref 0.8–1.7)
TRIGL SERPL-MCNC: 119 MG/DL (ref 30–149)
TSI SER-ACNC: 2.89 MIU/ML (ref 0.36–3.74)
VLDLC SERPL CALC-MCNC: 20 MG/DL (ref 0–30)
WBC # BLD AUTO: 5.8 X10(3) UL (ref 4–11)

## 2023-04-15 PROCEDURE — 84439 ASSAY OF FREE THYROXINE: CPT

## 2023-04-15 PROCEDURE — 80061 LIPID PANEL: CPT

## 2023-04-15 PROCEDURE — 36415 COLL VENOUS BLD VENIPUNCTURE: CPT

## 2023-04-15 PROCEDURE — 84443 ASSAY THYROID STIM HORMONE: CPT

## 2023-04-15 PROCEDURE — 85025 COMPLETE CBC W/AUTO DIFF WBC: CPT

## 2023-04-15 PROCEDURE — 80053 COMPREHEN METABOLIC PANEL: CPT

## 2023-09-29 ENCOUNTER — OFFICE VISIT (OUTPATIENT)
Dept: HEMATOLOGY/ONCOLOGY | Age: 53
End: 2023-09-29
Attending: INTERNAL MEDICINE
Payer: COMMERCIAL

## 2023-09-29 VITALS
DIASTOLIC BLOOD PRESSURE: 87 MMHG | SYSTOLIC BLOOD PRESSURE: 145 MMHG | OXYGEN SATURATION: 98 % | BODY MASS INDEX: 37 KG/M2 | TEMPERATURE: 98 F | HEART RATE: 65 BPM | WEIGHT: 260.5 LBS | RESPIRATION RATE: 18 BRPM

## 2023-09-29 DIAGNOSIS — I26.99 BILATERAL PULMONARY EMBOLISM (HCC): ICD-10-CM

## 2023-09-29 DIAGNOSIS — D69.6 THROMBOCYTOPENIA (HCC): ICD-10-CM

## 2023-09-29 DIAGNOSIS — D75.1 ERYTHROCYTOSIS: ICD-10-CM

## 2023-09-29 LAB
BASOPHILS # BLD AUTO: 0.09 X10(3) UL (ref 0–0.2)
BASOPHILS NFR BLD AUTO: 1.3 %
EOSINOPHIL # BLD AUTO: 0.12 X10(3) UL (ref 0–0.7)
EOSINOPHIL NFR BLD AUTO: 1.7 %
ERYTHROCYTE [DISTWIDTH] IN BLOOD BY AUTOMATED COUNT: 13.3 %
HCT VFR BLD AUTO: 51.5 %
HGB BLD-MCNC: 18.1 G/DL
IMM GRANULOCYTES # BLD AUTO: 0.04 X10(3) UL (ref 0–1)
IMM GRANULOCYTES NFR BLD: 0.6 %
LYMPHOCYTES # BLD AUTO: 2.64 X10(3) UL (ref 1–4)
LYMPHOCYTES NFR BLD AUTO: 36.8 %
MCH RBC QN AUTO: 30.6 PG (ref 26–34)
MCHC RBC AUTO-ENTMCNC: 35.1 G/DL (ref 31–37)
MCV RBC AUTO: 87 FL
MONOCYTES # BLD AUTO: 0.68 X10(3) UL (ref 0.1–1)
MONOCYTES NFR BLD AUTO: 9.5 %
NEUTROPHILS # BLD AUTO: 3.61 X10 (3) UL (ref 1.5–7.7)
NEUTROPHILS # BLD AUTO: 3.61 X10(3) UL (ref 1.5–7.7)
NEUTROPHILS NFR BLD AUTO: 50.1 %
PLATELET # BLD AUTO: 138 10(3)UL (ref 150–450)
RBC # BLD AUTO: 5.92 X10(6)UL
WBC # BLD AUTO: 7.2 X10(3) UL (ref 4–11)

## 2023-09-29 PROCEDURE — 99213 OFFICE O/P EST LOW 20 MIN: CPT | Performed by: INTERNAL MEDICINE

## 2023-09-29 NOTE — PROGRESS NOTES
Patient is here for follow up for PE on xarelto. He has noticed that he seems to have fungal infection of his toenails. He has a history of gout. He has not had an attack of this recently. He denies any fever, cough or shortness of breath. He has no bleeding symptoms.      Education Record    Learner:  Patient    Disease / Diagnosis: PE    Barriers / Limitations:  None   Comments:    Method:  Discussion   Comments:    General Topics:  Side effects and symptom management   Comments:    Outcome:  Shows understanding   Comments:

## 2023-11-28 ENCOUNTER — OFFICE VISIT (OUTPATIENT)
Dept: FAMILY MEDICINE CLINIC | Facility: CLINIC | Age: 53
End: 2023-11-28
Payer: COMMERCIAL

## 2023-11-28 VITALS
RESPIRATION RATE: 16 BRPM | SYSTOLIC BLOOD PRESSURE: 128 MMHG | BODY MASS INDEX: 37.54 KG/M2 | OXYGEN SATURATION: 99 % | WEIGHT: 262.19 LBS | DIASTOLIC BLOOD PRESSURE: 70 MMHG | HEIGHT: 70 IN | HEART RATE: 76 BPM | TEMPERATURE: 97 F

## 2023-11-28 DIAGNOSIS — M10.9 ACUTE GOUT OF LEFT ANKLE, UNSPECIFIED CAUSE: Primary | ICD-10-CM

## 2023-11-28 PROCEDURE — 3074F SYST BP LT 130 MM HG: CPT | Performed by: FAMILY MEDICINE

## 2023-11-28 PROCEDURE — 3008F BODY MASS INDEX DOCD: CPT | Performed by: FAMILY MEDICINE

## 2023-11-28 PROCEDURE — 99214 OFFICE O/P EST MOD 30 MIN: CPT | Performed by: FAMILY MEDICINE

## 2023-11-28 PROCEDURE — 3078F DIAST BP <80 MM HG: CPT | Performed by: FAMILY MEDICINE

## 2023-11-28 RX ORDER — COLCHICINE 0.6 MG/1
0.6 TABLET ORAL AS DIRECTED
Qty: 3 TABLET | Refills: 0 | Status: SHIPPED | OUTPATIENT
Start: 2023-11-28

## 2024-01-09 ENCOUNTER — TELEPHONE (OUTPATIENT)
Dept: HEMATOLOGY/ONCOLOGY | Facility: HOSPITAL | Age: 54
End: 2024-01-09

## 2024-01-09 NOTE — TELEPHONE ENCOUNTER
Toxicities: Rivaroxaban    Epistaxis     Keo reports for the last two weeks he has been having a blood nose every 2-3 days after blowing his nose. The first time it occurred he was using a Q Tip to clean dried mucus out of his nose. He has not had any spontaneous epistaxis. No active bleeding in his urine or stool. No unexplained bruising.    I asked Keo if he has a humidifier on his furnace. He said it is broken. I recommended he get a humidifier for his bed room and other living space he spends a lot of time in. I asked him to buy sterile nasal saline spray. He may do two sprays to each nostril. He may rub the outide of his nostril to dislodge any dried mucus and then gently blow his nose. He may apply a dab of petroleum jelly to the opening of each nostril and gently rub the outside of his nose to distribute it into the nose. He should never put Q Tips, or anything else up his nose. It will cause trauma and bleeding. If he has a nose bleed to apply pressure externally to his nose and apply ice. If he continues to have issues to please call the office. The patient verbalized understanding and agreed with the plan.

## 2024-01-31 ENCOUNTER — HOSPITAL ENCOUNTER (EMERGENCY)
Age: 54
Discharge: STILL ADMITTED OR ADMINISTRATIVE | End: 2024-01-31
Payer: COMMERCIAL

## 2024-01-31 ENCOUNTER — OFFICE VISIT (OUTPATIENT)
Dept: FAMILY MEDICINE CLINIC | Facility: CLINIC | Age: 54
End: 2024-01-31
Payer: COMMERCIAL

## 2024-01-31 DIAGNOSIS — R51.9 PRESSURE IN HEAD: ICD-10-CM

## 2024-01-31 DIAGNOSIS — R04.0 EPISTAXIS: Primary | ICD-10-CM

## 2024-01-31 DIAGNOSIS — R42 LIGHTHEADEDNESS: ICD-10-CM

## 2024-01-31 PROCEDURE — 99212 OFFICE O/P EST SF 10 MIN: CPT | Performed by: NURSE PRACTITIONER

## 2024-01-31 NOTE — PROGRESS NOTES
Pt presented with frequent nose bleeds for past month.  Patient is on Xarelto.  Has tried humidifier.  Reports they start when he's sleeping.  Last about 10-15 min, multiple times per day.  Feeling lightheaded at times and pressure on left side of head.    There were no vitals taken for this visit.    Accompanied by: wife  After triage, a higher level of care was recommended to pt.  Discussed limitations of WIC and need for further evaluation due to needing further evaluation  Referred to: ED  Patient verbalized understanding of rationale for further evaluation and was stable upon discharge.

## 2024-07-29 NOTE — TELEPHONE ENCOUNTER
Component Ref Range & Units 5 d ago  (7/24/24) 3 mo ago  (4/25/24) 9 mo ago  (10/11/23) 1 yr ago  (6/19/23) 1 yr ago  (2/20/23) 1 yr ago  (11/29/22) 2 yr ago  (7/20/22)   Hemoglobin A1c <=7.0 % 6.7 8.0 High  7.3 High  8.7 High  8.6 High  8.5 High  8.4 High    Estimated Average Glucose mg/dL 145.6 182.9 162.8 203.0 200.1 197.3 194.4      RX sent. Patient advised to make OV if no improvement. Patient verbalized understanding.

## 2024-11-08 DIAGNOSIS — I26.99 BILATERAL PULMONARY EMBOLISM (HCC): ICD-10-CM

## 2024-11-08 RX ORDER — RIVAROXABAN 20 MG/1
20 TABLET, FILM COATED ORAL
Qty: 90 TABLET | Refills: 0 | Status: SHIPPED | OUTPATIENT
Start: 2024-11-08 | End: 2025-02-07

## 2024-11-11 DIAGNOSIS — I26.99 BILATERAL PULMONARY EMBOLISM (HCC): ICD-10-CM

## 2024-11-11 RX ORDER — RIVAROXABAN 20 MG/1
20 TABLET, FILM COATED ORAL
Qty: 90 TABLET | Refills: 0 | OUTPATIENT
Start: 2024-11-11

## 2025-01-27 NOTE — PROGRESS NOTES
NURSING DISCHARGE NOTE    Discharged Home via Wheelchair. Accompanied by Spouse  Belongings Taken by patient/family. Discharge instructions discussed with patient, he verbalized understanding. Patient notified via [employer].

## 2025-02-07 DIAGNOSIS — I26.99 BILATERAL PULMONARY EMBOLISM (HCC): ICD-10-CM

## 2025-02-07 RX ORDER — RIVAROXABAN 20 MG/1
20 TABLET, FILM COATED ORAL
Qty: 90 TABLET | Refills: 0 | Status: SHIPPED | OUTPATIENT
Start: 2025-02-07 | End: 2025-05-06

## 2025-05-06 DIAGNOSIS — I26.99 BILATERAL PULMONARY EMBOLISM (HCC): ICD-10-CM

## 2025-05-06 RX ORDER — RIVAROXABAN 20 MG/1
20 TABLET, FILM COATED ORAL
Qty: 90 TABLET | Refills: 0 | Status: SHIPPED | OUTPATIENT
Start: 2025-05-06 | End: 2025-08-04

## (undated) DIAGNOSIS — I26.99 BILATERAL PULMONARY EMBOLISM (HCC): Primary | ICD-10-CM

## (undated) DEVICE — BLADE ELECTRODE: Brand: EDGE

## (undated) DEVICE — ENDOSCOPY PACK - LOWER: Brand: MEDLINE INDUSTRIES, INC.

## (undated) DEVICE — GLOVE SURG SENSICARE SZ 6-1/2

## (undated) DEVICE — LAPAROTOMY SPONGE - RF AND X-RAY DETECTABLE PRE-WASHED: Brand: SITUATE

## (undated) DEVICE — SOLUTION SURG DURA PREP HAZMAT

## (undated) DEVICE — SCD SLEEVE KNEE HI BLEND

## (undated) DEVICE — Device

## (undated) DEVICE — BANDAID COVERLET 1X3

## (undated) DEVICE — GLOVE SURG SENSICARE SZ 7-1/2

## (undated) DEVICE — LAMINECTOMY CDS: Brand: MEDLINE INDUSTRIES, INC.

## (undated) DEVICE — SUTURE VICRYL 0 CT-1

## (undated) DEVICE — FRAZIER SUCTION INSTRUMENT 12 FR W/CONTROL VENT & OBTURATOR: Brand: FRAZIER

## (undated) DEVICE — FORCEP RADIAL JAW 4

## (undated) DEVICE — STERILE POLYISOPRENE POWDER-FREE SURGICAL GLOVES: Brand: PROTEXIS

## (undated) DEVICE — SOL  .9 1000ML BTL

## (undated) DEVICE — SUTURE VICRYL 2-0 CT-1

## (undated) DEVICE — VIOLET BRAIDED (POLYGLACTIN 910), SYNTHETIC ABSORBABLE SUTURE: Brand: COATED VICRYL

## (undated) DEVICE — C-ARM: Brand: UNBRANDED

## (undated) DEVICE — LINE MNTR ADLT SET O2 INTMD

## (undated) DEVICE — ALCOHOL 70% 4 OZ

## (undated) DEVICE — EXOFIN TISSUE ADHESIVE 1.0ML

## (undated) DEVICE — Device: Brand: INTELLICART™

## (undated) DEVICE — MARKER SKIN 2 TIP

## (undated) DEVICE — SOLUTION ENDOSCOPIC ANTI-FOG NON-TOXIC NON-ABRASIVE 6 CUBIC CENTIMETER WITH RADIOPAQUE ADHESIVE-BACKED SPONGE STERILE NOT MADE WITH NATURAL RUBBER LATEX MEDICHOICE: Brand: MEDICHOICE

## (undated) DEVICE — NEEDLE SPINAL 22X5 405148

## (undated) DEVICE — UNDYED BRAIDED (POLYGLACTIN 910), SYNTHETIC ABSORBABLE SUTURE: Brand: COATED VICRYL

## (undated) DEVICE — 3.0MM PRECISION NEURO (MATCH HEAD)

## (undated) DEVICE — SUTURE VICRYL 2-0 CP-2

## (undated) DEVICE — C-ARMOR C-ARM EQUIPMENT COVERS CLEAR STERILE UNIVERSAL FIT 12 PER CASE: Brand: C-ARMOR

## (undated) DEVICE — PAIN TRAY: Brand: MEDLINE INDUSTRIES, INC.

## (undated) DEVICE — Device: Brand: DEFENDO AIR/WATER/SUCTION AND BIOPSY VALVE

## (undated) DEVICE — DRILL SRG OIL CRTDG MAESTRO

## (undated) DEVICE — 3M™ MEDIPORE™ SOFT CLOTH TAPE, 4 INCH X 10 YARDS, 12 ROLLS/CASE, 2964: Brand: 3M™ MEDIPORE™

## (undated) DEVICE — FLOSEAL HEMOSTATIC MATRIX, 5ML: Brand: FLOSEAL HEMOSTATIC MATRIX

## (undated) NOTE — LETTER
21  RE: Ammon Tatum    : 10/7/1970    Dear Dr. Spence Nurse    Your patient is being scheduled for a pain management procedure at [facility] within the next 4 weeks.     Procedure:  Left lumbar 4 transforaminal epidural steroid injection  Physician

## (undated) NOTE — LETTER
09/15/21          Re: Deandre Jimenez    : 10/7/1970      Dear Dr. Srinivas Rodriguez,     Your patient is being scheduled for LUMBAR LAMINECTOMY 2 LEVEL, lumbar three-four and lumbar four-five on 10/21/21. Our neurosurgeon:  Dr. Portia Haynes is requesting that the patient hold his Xarelto/Rivaroxaban 20 MG Oral Tab for 2 days prior to the procedure/surgery and for 10 days post procedure/surgery. Please advise on patient's cardiovascularstatus/pulmonary embolism and any recommendations you may have regarding the Xarelto. Please fax us in writing your detailed recommendations for holding the Xarelto, our fax # is [phone]    If you have any questions, please feel free to contact our office at [phone], option # 3.     Thank you,    Terry Zapata, RN   Clinical Staff Nurse,   [facility]

## (undated) NOTE — LETTER
Date: 9/28/2022    Patient Name: Noel Dominguez          To Whom it may concern: This letter has been written at the patient's request. The above patient was seen at the [facility] for treatment of a medical condition. This letter has been written at the patient's request. The above patient was seen at [facility] for treatment of a medical condition. 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 He had surgery for this last year. He has been experiencing back pain and neuropathy which would radiate to his legs. I would like for him to be accommodated to work until 8 hours a day. Limit carrying weight not more than 20 pounds. This is in order for him to hopefully help alleviate aforementioned symptoms. Thank you for your understanding.             Sincerely,    Isacc Elias MD

## (undated) NOTE — LETTER
Date: 12/1/2020    Patient Name: Jose Angel Warner          To Whom it may concern: This letter has been written at the patient's request. The above patient was seen at the [facility] for treatment of a medical condition.   He has ongoing lum

## (undated) NOTE — ED AVS SNAPSHOT
Christine Uriostegui   MRN: [id number]    Department:  [facility] Emergency Department   Date of Visit:  9/14/2019           Disclosure     Insurance plans vary and the physician(s) referred by the ER may not be covered by your plan.  Please contact y CARE PHYSICIAN AT ONCE OR RETURN IMMEDIATELY TO THE EMERGENCY DEPARTMENT. If you have been prescribed any medication(s), please fill your prescription right away and begin taking the medication(s) as directed.   If you believe that any of the medications

## (undated) NOTE — LETTER
To Whom It May Concern:    Luis Morales has been under our care regarding ongoing medical issues. He has been diagnosed with bilateral pulmonary embolism and is being treated with Xarelto.  He may resume his usual activities, including work, on May 1

## (undated) NOTE — MR AVS SNAPSHOT
[facility] [address]  [address] [place] [address]  [phone]               Thank you for choosing us for your health care visit with Tayler Carlos MD.  We are glad to serve you and happy to provide you with t ?High blood pressure  ? High cholesterol   ? Heart disease (including heart attacks)  ? Stroke  ? Sleep apnea (a disorder in which you stop breathing for short periods while asleep)  ? Asthma  ? Cancer  Does being obese shorten a person’s life? — Yes.  Studies sh 5. Keep a food log. To adhere to diet better- get an Trudy on your smartphone or keep a food journal to count calories. Note the times and what you are eating. Now more than ever there are tools to log your calories.   Such as the Trudy Calorie counter(my fitn - low fat greek yogurt   - sweet potatoes   - peas   - black beans   - kidney beans   - lentils   - brown rice   - barley   - amaranth   - quinoa   - whole grain bread       • Please signup for MY CHART, which is electronic access to your record if you hav healthier and to improve your quality of life.     Referrals, and Radiology Information:    If your insurance requires a referral to a specialist, please allow 5 business days to process your referral request.    If Bakari Cummings MD orders a CT or MRI, it ma Hemoglobin A1C [E]    Complete by:  Feb 10, 2017 (Approximate)    Assoc Dx:  Routine general medical examination at a health care facility [Z00.00]           Urinalysis, Routine [E]    Complete by:  Feb 10, 2017    Assoc Dx:   Low back pain, unspecified ba Tips for making healthy food choices  -   Enjoy your food, but eat less. Fully enjoy your food when eating. Don’t eat while distracted and slow down. Avoid over sized portions. Don’t eat while when you’re bored.      EAT THESE FOODS MORE OFTEN: EAT T

## (undated) NOTE — LETTER
21  RE: Reji Pollock    : 10/7/1970    Dear Dr. Sukh Shields patient is being scheduled for a pain management procedure at [facility] within the next 4 weeks.     Procedure:  Left lumbar 4 transforaminal epidural steroid injection  Physici

## (undated) NOTE — LETTER
09/28/22    Sary Finch      To Whom It May Concern: This letter has been written at the patient's request. The above patient was seen at [facility] for treatment of a medical condition. 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 He had surgery for this last year. He has been experiencing back pain and neuropathy which would radiate to his legs. I would like for him to be accommodated to work until 8 hours. Limit carrying weight not more than 20 pounds. This is in order for him to hopefully help alleviate aforementioned symptoms. Thank you for your understanding.       Sincerely,        Fabian Nguyen MD  09/28/22, 1:26 PM

## (undated) NOTE — LETTER
09/15/21      RE: Angela Edwards     : 10/7/1970    Dear Dr. Marciano Delatorre,    This letter is to inform you that your patient is being scheduled for surgery with Dr. Rocio Burrows on 10/21/21 at [facility]. We have asked the patient to contact your office to schedule a pre-operative exam/visit. Diagnosis: Other spondylosis with radiculopathy, lumbar region, Mechanical low back    pain    Procedure: LUMBAR LAMINECTOMY 2 LEVEL, lumbar three-four and lumbar four-five    A Pre-operative History & Physical is needed for medical clearance. Please address patient's active problems (i.e high blood pressure, breathing issues etc.). [facility] If any labs/testing are being done through the PCP office, then results should be faxed to the pre-admission testing department at [facility] [phone]. Please also fax clearance letter/office visit note to the Pre-Admission department or our office at fax #: [phone]. The following orders will be placed by pre-admission testing:  CBC  Comprehensive Metabolic Panel  PT/PTT/INR  MRSA/MSSA Nasal Swab  Covid-19 testing  (*And any other pertinent testing based on patient's current clinical condition.)      If you have any questions, you may contact our office at [phone], option # 3.     Thank you,    Roxana Scott, RN   Clinical Staff Nurse,   [facility]

## (undated) NOTE — LETTER
Date: 9/28/2022    Patient Name: Yaima Montoya          To Whom it may concern: This letter has been written at the patient's request. The above patient was seen at the [facility] for treatment of a medical condition. This patient should be excused from attending work from ***  The patient may return to work/school on *** with the following limitations ***.         Sincerely,    Ana Figueroa MD

## (undated) NOTE — LETTER
Date: 9/10/2020    Patient Name: Tamara Flowers          To Whom it may concern:     This letter has been written at the patient's request. The above patient was seen at the [facility] for treatment of left lumbar radiculopathy resulting in lo

## (undated) NOTE — LETTER
Date: 12/6/2021    Patient Name: Emma Keith          To Whom it may concern: This letter has been written at the patient's request. The above patient was seen at the [facility] for treatment of a medical condition.         The patient m